# Patient Record
Sex: FEMALE | Race: WHITE | Employment: OTHER | URBAN - METROPOLITAN AREA
[De-identification: names, ages, dates, MRNs, and addresses within clinical notes are randomized per-mention and may not be internally consistent; named-entity substitution may affect disease eponyms.]

---

## 2018-06-06 ENCOUNTER — NURSE TRIAGE (OUTPATIENT)
Dept: NURSING | Facility: CLINIC | Age: 68
End: 2018-06-06

## 2018-06-06 NOTE — TELEPHONE ENCOUNTER
"Patient's  called with patient present reporting that patient received a cortisone shot in left shoulder earlier today.  Patient is currently having jaw pain on both sides and headache. Patient rates her pain at a \"4-5\" on a 1-10 pain scale. Denies fever, swelling, redness, difficulty breathing/swallowing, chest pain, weakness/numbness on face/arm/leg on one side of the body, vision issues, severe pain, and vomiting. Reviewed guidelines and advised for patient to be seen within 24 hours by PCP. Patient and  agreed with plan. RN advised to call back with any changes, worsening of symptoms, and questions or concerns.       Reason for Disposition    [1] MODERATE headache (e.g., interferes with normal activities) AND [2] present > 24 hours AND [3] unexplained  (Exceptions: analgesics not tried, typical migraine, or headache part of viral illness)    Additional Information    Negative: Difficult to awaken or acting confused  (e.g., disoriented, slurred speech)    Negative: [1] Weakness of the face, arm or leg on one side of the body AND [2] new onset    Negative: [1] Numbness of the face, arm or leg on one side of the body AND [2] new onset    Negative: [1] Loss of speech or garbled speech AND [2] new onset    Negative: Passed out (i.e., lost consciousness, collapsed and was not responding)    Negative: Sounds like a life-threatening emergency to the triager    Negative: Followed a head injury within last 3 days    Negative: Pregnant    Negative: Traumatic Brain Injury (TBI) is suspected    Negative: Unable to walk, or can only walk with assistance (e.g., requires support)    Negative: Stiff neck (can't touch chin to chest)    Negative: Severe pain in one eye    Negative: [1] Other family members (or roommates) with headaches AND [2] possibility of carbon monoxide exposure    Negative: [1] SEVERE headache (e.g., excruciating) AND [2] \"worst headache\" of life    Negative: [1] SEVERE headache AND [2] " sudden-onset (i.e., reaching maximum intensity within seconds)    Negative: [1] SEVERE headache AND [2] fever    Negative: Loss of vision or double vision (Exception: same as prior migraines)    Negative: [1] Fever > 100.5 F (38.1 C) AND [2] diabetes mellitus or weak immune system (e.g., HIV positive, cancer chemo, splenectomy, organ transplant, chronic steroids)    Negative: Patient sounds very sick or weak to the triager    Negative: [1] SEVERE headache (e.g., excruciating) AND [2] not improved after 2 hours of pain medicine    Negative: [1] Vomiting AND [2] 2 or more times (Exception: similar to previous migraines)    Negative: Fever > 104 F (40 C)    Protocols used: HEADACHE-ADULT-AH    Francisca Parham RN/[facility]

## 2019-07-24 RX ORDER — CALCIUM/MAGNESIUM/ZINC 333-133 MG
175 TABLET ORAL DAILY
COMMUNITY
End: 2019-10-21

## 2019-07-24 RX ORDER — LANOLIN ALCOHOL/MO/W.PET/CERES
1000 CREAM (GRAM) TOPICAL DAILY
COMMUNITY
End: 2019-10-21

## 2019-07-24 RX ORDER — MULTIVIT-MIN/IRON/FOLIC ACID/K 18-600-40
CAPSULE ORAL DAILY
COMMUNITY
End: 2019-10-21

## 2019-07-24 RX ORDER — TRAVOPROST OPHTHALMIC SOLUTION 0.04 MG/ML
1 SOLUTION OPHTHALMIC AT BEDTIME
COMMUNITY

## 2019-07-24 RX ORDER — TRIAMTERENE/HYDROCHLOROTHIAZID 37.5-25 MG
1 TABLET ORAL DAILY
COMMUNITY

## 2019-07-24 ASSESSMENT — MIFFLIN-ST. JEOR: SCORE: 1812.29

## 2019-07-25 ENCOUNTER — HOSPITAL ENCOUNTER (OUTPATIENT)
Dept: LAB | Facility: CLINIC | Age: 69
Discharge: HOME | End: 2019-07-25
Attending: ORTHOPAEDIC SURGERY | Admitting: ORTHOPAEDIC SURGERY
Payer: MEDICARE

## 2019-07-25 DIAGNOSIS — Z01.812 PRE-OPERATIVE LABORATORY EXAMINATION: ICD-10-CM

## 2019-07-25 PROCEDURE — 40000829 ZZHCL STATISTIC STAPH AUREUS SUSCEPT SCREEN PCR: Performed by: ORTHOPAEDIC SURGERY

## 2019-07-25 PROCEDURE — 87641 MR-STAPH DNA AMP PROBE: CPT | Performed by: ORTHOPAEDIC SURGERY

## 2019-07-25 PROCEDURE — 87640 STAPH A DNA AMP PROBE: CPT | Performed by: ORTHOPAEDIC SURGERY

## 2019-07-25 PROCEDURE — 40000830 ZZHCL STATISTIC STAPH AUREUS METH RESIST SCREEN PCR: Performed by: ORTHOPAEDIC SURGERY

## 2019-07-26 LAB
MRSA DNA SPEC QL NAA+PROBE: NEGATIVE
SPECIMEN SOURCE: NORMAL

## 2019-08-04 NOTE — PHARMACY-ADMISSION MEDICATION HISTORY
Admission medication history interview status for this patient is complete. See [facility] admission navigator for allergy information, prior to admission medications and immunization status.     Med rec completed by pre admitting RN and reviewed by Pharmacy   Anna Yun RN Wed Jul 24, 2019  1:16 PM         Prior to Admission medications    Medication Sig Last Dose Taking? Auth Provider   Ascorbic Acid (VITAMIN C) 500 MG CAPS Take by mouth daily  Yes Reported, Patient   Calcium Carbonate (CALCIUM 600 PO) Take 600 mg by mouth daily  Yes Reported, Patient   cholecalciferol (VITAMIN D3) 5000 units (125 mcg) capsule Take by mouth daily  Yes Reported, Patient   Coenzyme Q10 (COQ10 PO) Take by mouth daily  Yes Reported, Patient   cyanocobalamin (VITAMIN B-12) 1000 MCG tablet Take 1,000 mcg by mouth daily  Yes Reported, Patient   Garlic 1000 MG CAPS Take 1,000 mg by mouth daily  Yes Reported, Patient   Milk Thistle-Dand-Fennel-Licor (MILK THISTLE XTRA) CAPS capsule Take 175 mg by mouth daily  Yes Reported, Patient   Omega-3 Fatty Acids (FISH OIL PO) Take by mouth daily  Yes Reported, Patient   pyridOXINE (VITAMIN B6) 100 MG TABS Take 100 mg by mouth daily  Yes Reported, Patient   travoprost LUANA FREE (TRAVATAN Z) 0.004 % ophthalmic solution Place 1 drop into both eyes At Bedtime  Yes Reported, Patient   triamterene-HCTZ (MAXZIDE-25) 37.5-25 MG tablet Take 1 tablet by mouth daily  Yes Reported, Patient

## 2019-08-12 ENCOUNTER — HOSPITAL ENCOUNTER (INPATIENT)
Facility: CLINIC | Age: 69
LOS: 1 days | Discharge: HOME | DRG: 470 | End: 2019-08-13
Attending: ORTHOPAEDIC SURGERY | Admitting: ORTHOPAEDIC SURGERY
Payer: MEDICARE

## 2019-08-12 ENCOUNTER — APPOINTMENT (OUTPATIENT)
Dept: GENERAL RADIOLOGY | Facility: CLINIC | Age: 69
DRG: 470 | End: 2019-08-12
Attending: ORTHOPAEDIC SURGERY
Payer: MEDICARE

## 2019-08-12 ENCOUNTER — ANESTHESIA (OUTPATIENT)
Dept: SURGERY | Facility: CLINIC | Age: 69
DRG: 470 | End: 2019-08-12
Payer: MEDICARE

## 2019-08-12 ENCOUNTER — ANESTHESIA EVENT (OUTPATIENT)
Dept: SURGERY | Facility: CLINIC | Age: 69
DRG: 470 | End: 2019-08-12
Payer: MEDICARE

## 2019-08-12 DIAGNOSIS — Z96.652 STATUS POST TOTAL LEFT KNEE REPLACEMENT: Primary | ICD-10-CM

## 2019-08-12 PROBLEM — Z96.659 S/P TOTAL KNEE ARTHROPLASTY: Status: ACTIVE | Noted: 2019-08-12

## 2019-08-12 LAB
CREAT SERPL-MCNC: 0.95 MG/DL (ref 0.52–1.04)
GFR SERPL CREATININE-BSD FRML MDRD: 61 ML/MIN/{1.73_M2}
PLATELET # BLD AUTO: 351 10E9/L (ref 150–450)

## 2019-08-12 PROCEDURE — 40000171 ZZH STATISTIC PRE-PROCEDURE ASSESSMENT III: Performed by: ORTHOPAEDIC SURGERY

## 2019-08-12 PROCEDURE — 25000132 ZZH RX MED GY IP 250 OP 250 PS 637: Mod: GY | Performed by: ORTHOPAEDIC SURGERY

## 2019-08-12 PROCEDURE — 85049 AUTOMATED PLATELET COUNT: CPT | Performed by: ORTHOPAEDIC SURGERY

## 2019-08-12 PROCEDURE — C1776 JOINT DEVICE (IMPLANTABLE): HCPCS | Performed by: ORTHOPAEDIC SURGERY

## 2019-08-12 PROCEDURE — 71000013 ZZH RECOVERY PHASE 1 LEVEL 1 EA ADDTL HR: Performed by: ORTHOPAEDIC SURGERY

## 2019-08-12 PROCEDURE — 25000125 ZZHC RX 250: Performed by: PHYSICIAN ASSISTANT

## 2019-08-12 PROCEDURE — 36000063 ZZH SURGERY LEVEL 4 EA 15 ADDTL MIN: Performed by: ORTHOPAEDIC SURGERY

## 2019-08-12 PROCEDURE — 0SRD0J9 REPLACEMENT OF LEFT KNEE JOINT WITH SYNTHETIC SUBSTITUTE, CEMENTED, OPEN APPROACH: ICD-10-PCS | Performed by: ORTHOPAEDIC SURGERY

## 2019-08-12 PROCEDURE — 25000132 ZZH RX MED GY IP 250 OP 250 PS 637: Mod: GY | Performed by: ANESTHESIOLOGY

## 2019-08-12 PROCEDURE — 25000128 H RX IP 250 OP 636: Performed by: NURSE ANESTHETIST, CERTIFIED REGISTERED

## 2019-08-12 PROCEDURE — 40000986 XR KNEE PORT LT 1/2 VW

## 2019-08-12 PROCEDURE — 12000000 ZZH R&B MED SURG/OB

## 2019-08-12 PROCEDURE — 71000012 ZZH RECOVERY PHASE 1 LEVEL 1 FIRST HR: Performed by: ORTHOPAEDIC SURGERY

## 2019-08-12 PROCEDURE — 82565 ASSAY OF CREATININE: CPT | Performed by: ORTHOPAEDIC SURGERY

## 2019-08-12 PROCEDURE — 25800030 ZZH RX IP 258 OP 636: Performed by: ANESTHESIOLOGY

## 2019-08-12 PROCEDURE — 25000128 H RX IP 250 OP 636: Performed by: ORTHOPAEDIC SURGERY

## 2019-08-12 PROCEDURE — 25000128 H RX IP 250 OP 636: Performed by: ANESTHESIOLOGY

## 2019-08-12 PROCEDURE — 25000125 ZZHC RX 250: Performed by: ORTHOPAEDIC SURGERY

## 2019-08-12 PROCEDURE — 36415 COLL VENOUS BLD VENIPUNCTURE: CPT | Performed by: ORTHOPAEDIC SURGERY

## 2019-08-12 PROCEDURE — 36000093 ZZH SURGERY LEVEL 4 1ST 30 MIN: Performed by: ORTHOPAEDIC SURGERY

## 2019-08-12 PROCEDURE — 99221 1ST HOSP IP/OBS SF/LOW 40: CPT | Performed by: INTERNAL MEDICINE

## 2019-08-12 PROCEDURE — 25000125 ZZHC RX 250: Performed by: NURSE ANESTHETIST, CERTIFIED REGISTERED

## 2019-08-12 PROCEDURE — 37000009 ZZH ANESTHESIA TECHNICAL FEE, EACH ADDTL 15 MIN: Performed by: ORTHOPAEDIC SURGERY

## 2019-08-12 PROCEDURE — 27810169 ZZH OR IMPLANT GENERAL: Performed by: ORTHOPAEDIC SURGERY

## 2019-08-12 PROCEDURE — 25000128 H RX IP 250 OP 636: Performed by: INTERNAL MEDICINE

## 2019-08-12 PROCEDURE — 25800025 ZZH RX 258: Performed by: ORTHOPAEDIC SURGERY

## 2019-08-12 PROCEDURE — 25000128 H RX IP 250 OP 636: Performed by: PHYSICIAN ASSISTANT

## 2019-08-12 PROCEDURE — 25000132 ZZH RX MED GY IP 250 OP 250 PS 637: Mod: GY | Performed by: INTERNAL MEDICINE

## 2019-08-12 PROCEDURE — 25000132 ZZH RX MED GY IP 250 OP 250 PS 637: Mod: GY | Performed by: PHYSICIAN ASSISTANT

## 2019-08-12 PROCEDURE — 25000125 ZZHC RX 250: Performed by: INTERNAL MEDICINE

## 2019-08-12 PROCEDURE — 27210794 ZZH OR GENERAL SUPPLY STERILE: Performed by: ORTHOPAEDIC SURGERY

## 2019-08-12 PROCEDURE — 25800030 ZZH RX IP 258 OP 636: Performed by: ORTHOPAEDIC SURGERY

## 2019-08-12 PROCEDURE — 37000008 ZZH ANESTHESIA TECHNICAL FEE, 1ST 30 MIN: Performed by: ORTHOPAEDIC SURGERY

## 2019-08-12 PROCEDURE — 99207 ZZC CONSULT E&M CHANGED TO INITIAL LEVEL: CPT | Performed by: INTERNAL MEDICINE

## 2019-08-12 DEVICE — IMP COMP FEMORAL F LT 00-5764-016-51: Type: IMPLANTABLE DEVICE | Site: KNEE | Status: FUNCTIONAL

## 2019-08-12 DEVICE — IMP ART SURFACE ZIM NEXGEN LPS EF 3-4 10MM 00-5964-032-10: Type: IMPLANTABLE DEVICE | Site: KNEE | Status: FUNCTIONAL

## 2019-08-12 DEVICE — IMP COMP PATELLA ZIM NEXGEN 8.5X32MM 00-5972-065-32: Type: IMPLANTABLE DEVICE | Site: KNEE | Status: FUNCTIONAL

## 2019-08-12 DEVICE — IMP COMP TIBIAL STEMMED ZIM NEXGEN SIZE 3 5980-37-01: Type: IMPLANTABLE DEVICE | Site: KNEE | Status: FUNCTIONAL

## 2019-08-12 DEVICE — BONE CEMENT SIMPLEX W/TOBRAMYCIN 6197-9-001: Type: IMPLANTABLE DEVICE | Site: KNEE | Status: FUNCTIONAL

## 2019-08-12 RX ORDER — CARBOXYMETHYLCELLULOSE SODIUM 5 MG/ML
1 SOLUTION/ DROPS OPHTHALMIC 4 TIMES DAILY PRN
COMMUNITY

## 2019-08-12 RX ORDER — CEFAZOLIN SODIUM 2 G/100ML
2 INJECTION, SOLUTION INTRAVENOUS EVERY 8 HOURS
Status: COMPLETED | OUTPATIENT
Start: 2019-08-12 | End: 2019-08-12

## 2019-08-12 RX ORDER — FERROUS GLUCONATE 324(38)MG
324 TABLET ORAL DAILY
Status: DISCONTINUED | OUTPATIENT
Start: 2019-08-12 | End: 2019-08-13 | Stop reason: HOSPADM

## 2019-08-12 RX ORDER — LIDOCAINE 40 MG/G
CREAM TOPICAL
Status: DISCONTINUED | OUTPATIENT
Start: 2019-08-12 | End: 2019-08-13 | Stop reason: HOSPADM

## 2019-08-12 RX ORDER — ONDANSETRON 2 MG/ML
INJECTION INTRAMUSCULAR; INTRAVENOUS PRN
Status: DISCONTINUED | OUTPATIENT
Start: 2019-08-12 | End: 2019-08-12

## 2019-08-12 RX ORDER — MORPHINE SULFATE 4 MG/ML
2-4 INJECTION, SOLUTION INTRAMUSCULAR; INTRAVENOUS EVERY 5 MIN PRN
Status: DISCONTINUED | OUTPATIENT
Start: 2019-08-12 | End: 2019-08-12 | Stop reason: HOSPADM

## 2019-08-12 RX ORDER — FENTANYL CITRATE 50 UG/ML
INJECTION, SOLUTION INTRAMUSCULAR; INTRAVENOUS PRN
Status: DISCONTINUED | OUTPATIENT
Start: 2019-08-12 | End: 2019-08-12

## 2019-08-12 RX ORDER — TRIAMTERENE/HYDROCHLOROTHIAZID 37.5-25 MG
1 TABLET ORAL DAILY
Status: DISCONTINUED | OUTPATIENT
Start: 2019-08-12 | End: 2019-08-13 | Stop reason: HOSPADM

## 2019-08-12 RX ORDER — ONDANSETRON 2 MG/ML
4 INJECTION INTRAMUSCULAR; INTRAVENOUS EVERY 30 MIN PRN
Status: DISCONTINUED | OUTPATIENT
Start: 2019-08-12 | End: 2019-08-12 | Stop reason: HOSPADM

## 2019-08-12 RX ORDER — TRAMADOL HYDROCHLORIDE 50 MG/1
50 TABLET ORAL EVERY 6 HOURS PRN
Status: DISCONTINUED | OUTPATIENT
Start: 2019-08-12 | End: 2019-08-13 | Stop reason: HOSPADM

## 2019-08-12 RX ORDER — CHLORAL HYDRATE 500 MG
1 CAPSULE ORAL DAILY
Status: DISCONTINUED | OUTPATIENT
Start: 2019-08-13 | End: 2019-08-13 | Stop reason: HOSPADM

## 2019-08-12 RX ORDER — MORPHINE SULFATE 2 MG/ML
2-4 INJECTION, SOLUTION INTRAMUSCULAR; INTRAVENOUS
Status: DISCONTINUED | OUTPATIENT
Start: 2019-08-12 | End: 2019-08-13 | Stop reason: HOSPADM

## 2019-08-12 RX ORDER — TRAVOPROST OPHTHALMIC SOLUTION 0.04 MG/ML
1 SOLUTION OPHTHALMIC AT BEDTIME
Status: DISCONTINUED | OUTPATIENT
Start: 2019-08-12 | End: 2019-08-13 | Stop reason: HOSPADM

## 2019-08-12 RX ORDER — PROCHLORPERAZINE MALEATE 5 MG
5 TABLET ORAL EVERY 6 HOURS PRN
Status: DISCONTINUED | OUTPATIENT
Start: 2019-08-12 | End: 2019-08-13 | Stop reason: HOSPADM

## 2019-08-12 RX ORDER — NALOXONE HYDROCHLORIDE 0.4 MG/ML
.1-.4 INJECTION, SOLUTION INTRAMUSCULAR; INTRAVENOUS; SUBCUTANEOUS
Status: DISCONTINUED | OUTPATIENT
Start: 2019-08-12 | End: 2019-08-13 | Stop reason: HOSPADM

## 2019-08-12 RX ORDER — CARBOXYMETHYLCELLULOSE SODIUM 10 MG/ML
1 GEL OPHTHALMIC 4 TIMES DAILY PRN
Status: DISCONTINUED | OUTPATIENT
Start: 2019-08-12 | End: 2019-08-12

## 2019-08-12 RX ORDER — SODIUM CHLORIDE, SODIUM LACTATE, POTASSIUM CHLORIDE, CALCIUM CHLORIDE 600; 310; 30; 20 MG/100ML; MG/100ML; MG/100ML; MG/100ML
INJECTION, SOLUTION INTRAVENOUS CONTINUOUS
Status: DISCONTINUED | OUTPATIENT
Start: 2019-08-12 | End: 2019-08-12 | Stop reason: HOSPADM

## 2019-08-12 RX ORDER — ACETAMINOPHEN 325 MG/1
650 TABLET ORAL EVERY 4 HOURS PRN
Status: DISCONTINUED | OUTPATIENT
Start: 2019-08-15 | End: 2019-08-13 | Stop reason: HOSPADM

## 2019-08-12 RX ORDER — LABETALOL 20 MG/4 ML (5 MG/ML) INTRAVENOUS SYRINGE
PRN
Status: DISCONTINUED | OUTPATIENT
Start: 2019-08-12 | End: 2019-08-12

## 2019-08-12 RX ORDER — SODIUM CHLORIDE, SODIUM LACTATE, POTASSIUM CHLORIDE, CALCIUM CHLORIDE 600; 310; 30; 20 MG/100ML; MG/100ML; MG/100ML; MG/100ML
INJECTION, SOLUTION INTRAVENOUS CONTINUOUS
Status: DISCONTINUED | OUTPATIENT
Start: 2019-08-12 | End: 2019-08-13 | Stop reason: HOSPADM

## 2019-08-12 RX ORDER — HYDRALAZINE HYDROCHLORIDE 20 MG/ML
2.5-5 INJECTION INTRAMUSCULAR; INTRAVENOUS EVERY 10 MIN PRN
Status: DISCONTINUED | OUTPATIENT
Start: 2019-08-12 | End: 2019-08-12 | Stop reason: HOSPADM

## 2019-08-12 RX ORDER — ONDANSETRON 4 MG/1
4 TABLET, ORALLY DISINTEGRATING ORAL EVERY 30 MIN PRN
Status: DISCONTINUED | OUTPATIENT
Start: 2019-08-12 | End: 2019-08-12 | Stop reason: HOSPADM

## 2019-08-12 RX ORDER — DIFLUPREDNATE OPHTHALMIC 0.5 MG/ML
1 EMULSION OPHTHALMIC AT BEDTIME
Status: DISCONTINUED | OUTPATIENT
Start: 2019-08-12 | End: 2019-08-13 | Stop reason: HOSPADM

## 2019-08-12 RX ORDER — ONDANSETRON 4 MG/1
4 TABLET, ORALLY DISINTEGRATING ORAL EVERY 6 HOURS PRN
Status: DISCONTINUED | OUTPATIENT
Start: 2019-08-12 | End: 2019-08-13 | Stop reason: HOSPADM

## 2019-08-12 RX ORDER — DIFLUPREDNATE OPHTHALMIC 0.5 MG/ML
1 EMULSION OPHTHALMIC EVERY MORNING
Status: DISCONTINUED | OUTPATIENT
Start: 2019-08-13 | End: 2019-08-13 | Stop reason: HOSPADM

## 2019-08-12 RX ORDER — LABETALOL 20 MG/4 ML (5 MG/ML) INTRAVENOUS SYRINGE
10
Status: DISCONTINUED | OUTPATIENT
Start: 2019-08-12 | End: 2019-08-12 | Stop reason: HOSPADM

## 2019-08-12 RX ORDER — DIPHENHYDRAMINE HYDROCHLORIDE 50 MG/ML
12.5 INJECTION INTRAMUSCULAR; INTRAVENOUS EVERY 6 HOURS PRN
Status: DISCONTINUED | OUTPATIENT
Start: 2019-08-12 | End: 2019-08-13 | Stop reason: HOSPADM

## 2019-08-12 RX ORDER — AMOXICILLIN 250 MG
2 CAPSULE ORAL 2 TIMES DAILY
Status: DISCONTINUED | OUTPATIENT
Start: 2019-08-12 | End: 2019-08-13 | Stop reason: HOSPADM

## 2019-08-12 RX ORDER — CEFAZOLIN SODIUM 1 G/3ML
1 INJECTION, POWDER, FOR SOLUTION INTRAMUSCULAR; INTRAVENOUS SEE ADMIN INSTRUCTIONS
Status: DISCONTINUED | OUTPATIENT
Start: 2019-08-12 | End: 2019-08-12 | Stop reason: HOSPADM

## 2019-08-12 RX ORDER — CELECOXIB 200 MG/1
400 CAPSULE ORAL ONCE
Status: COMPLETED | OUTPATIENT
Start: 2019-08-12 | End: 2019-08-12

## 2019-08-12 RX ORDER — DIPHENHYDRAMINE HCL 12.5MG/5ML
12.5 LIQUID (ML) ORAL EVERY 6 HOURS PRN
Status: DISCONTINUED | OUTPATIENT
Start: 2019-08-12 | End: 2019-08-13 | Stop reason: HOSPADM

## 2019-08-12 RX ORDER — ACETAMINOPHEN 325 MG/1
975 TABLET ORAL EVERY 8 HOURS
Status: DISCONTINUED | OUTPATIENT
Start: 2019-08-12 | End: 2019-08-13 | Stop reason: HOSPADM

## 2019-08-12 RX ORDER — PROPOFOL 10 MG/ML
INJECTION, EMULSION INTRAVENOUS CONTINUOUS PRN
Status: DISCONTINUED | OUTPATIENT
Start: 2019-08-12 | End: 2019-08-12

## 2019-08-12 RX ORDER — SCOLOPAMINE TRANSDERMAL SYSTEM 1 MG/1
1 PATCH, EXTENDED RELEASE TRANSDERMAL
Status: DISCONTINUED | OUTPATIENT
Start: 2019-08-12 | End: 2019-08-13 | Stop reason: HOSPADM

## 2019-08-12 RX ORDER — NEOSTIGMINE METHYLSULFATE 1 MG/ML
VIAL (ML) INJECTION PRN
Status: DISCONTINUED | OUTPATIENT
Start: 2019-08-12 | End: 2019-08-12

## 2019-08-12 RX ORDER — AMOXICILLIN 250 MG
1 CAPSULE ORAL 2 TIMES DAILY
Status: DISCONTINUED | OUTPATIENT
Start: 2019-08-12 | End: 2019-08-13 | Stop reason: HOSPADM

## 2019-08-12 RX ORDER — HYDROXYZINE HYDROCHLORIDE 10 MG/1
10 TABLET, FILM COATED ORAL EVERY 6 HOURS PRN
Status: DISCONTINUED | OUTPATIENT
Start: 2019-08-12 | End: 2019-08-13 | Stop reason: HOSPADM

## 2019-08-12 RX ORDER — LIDOCAINE HYDROCHLORIDE 10 MG/ML
INJECTION, SOLUTION INFILTRATION; PERINEURAL PRN
Status: DISCONTINUED | OUTPATIENT
Start: 2019-08-12 | End: 2019-08-12

## 2019-08-12 RX ORDER — CEFAZOLIN SODIUM IN 0.9 % NACL 3 G/100 ML
3 INTRAVENOUS SOLUTION, PIGGYBACK (ML) INTRAVENOUS
Status: COMPLETED | OUTPATIENT
Start: 2019-08-12 | End: 2019-08-12

## 2019-08-12 RX ORDER — ACETAMINOPHEN 500 MG
1000 TABLET ORAL ONCE
Status: COMPLETED | OUTPATIENT
Start: 2019-08-12 | End: 2019-08-12

## 2019-08-12 RX ORDER — CARBOXYMETHYLCELLULOSE SODIUM 5 MG/ML
1 SOLUTION/ DROPS OPHTHALMIC 4 TIMES DAILY PRN
Status: DISCONTINUED | OUTPATIENT
Start: 2019-08-12 | End: 2019-08-13 | Stop reason: HOSPADM

## 2019-08-12 RX ORDER — LIDOCAINE 40 MG/G
CREAM TOPICAL
Status: DISCONTINUED | OUTPATIENT
Start: 2019-08-12 | End: 2019-08-12 | Stop reason: HOSPADM

## 2019-08-12 RX ORDER — FENTANYL CITRATE 50 UG/ML
25-50 INJECTION, SOLUTION INTRAMUSCULAR; INTRAVENOUS
Status: DISCONTINUED | OUTPATIENT
Start: 2019-08-12 | End: 2019-08-12 | Stop reason: HOSPADM

## 2019-08-12 RX ORDER — METOCLOPRAMIDE HYDROCHLORIDE 5 MG/ML
5 INJECTION INTRAMUSCULAR; INTRAVENOUS ONCE
Status: COMPLETED | OUTPATIENT
Start: 2019-08-12 | End: 2019-08-12

## 2019-08-12 RX ORDER — LORAZEPAM 2 MG/ML
0.5 INJECTION INTRAMUSCULAR EVERY 6 HOURS PRN
Status: DISCONTINUED | OUTPATIENT
Start: 2019-08-12 | End: 2019-08-13 | Stop reason: HOSPADM

## 2019-08-12 RX ORDER — HYDROMORPHONE HYDROCHLORIDE 1 MG/ML
.3-.5 INJECTION, SOLUTION INTRAMUSCULAR; INTRAVENOUS; SUBCUTANEOUS
Status: DISCONTINUED | OUTPATIENT
Start: 2019-08-12 | End: 2019-08-12 | Stop reason: ALTCHOICE

## 2019-08-12 RX ORDER — CELECOXIB 200 MG/1
200 CAPSULE ORAL DAILY
Status: DISCONTINUED | OUTPATIENT
Start: 2019-08-13 | End: 2019-08-13 | Stop reason: HOSPADM

## 2019-08-12 RX ORDER — NALOXONE HYDROCHLORIDE 0.4 MG/ML
.1-.4 INJECTION, SOLUTION INTRAMUSCULAR; INTRAVENOUS; SUBCUTANEOUS
Status: DISCONTINUED | OUTPATIENT
Start: 2019-08-12 | End: 2019-08-12

## 2019-08-12 RX ORDER — METOPROLOL TARTRATE 1 MG/ML
INJECTION, SOLUTION INTRAVENOUS PRN
Status: DISCONTINUED | OUTPATIENT
Start: 2019-08-12 | End: 2019-08-12

## 2019-08-12 RX ORDER — GLYCOPYRROLATE 0.2 MG/ML
INJECTION, SOLUTION INTRAMUSCULAR; INTRAVENOUS PRN
Status: DISCONTINUED | OUTPATIENT
Start: 2019-08-12 | End: 2019-08-12

## 2019-08-12 RX ORDER — DEXAMETHASONE SODIUM PHOSPHATE 4 MG/ML
INJECTION, SOLUTION INTRA-ARTICULAR; INTRALESIONAL; INTRAMUSCULAR; INTRAVENOUS; SOFT TISSUE PRN
Status: DISCONTINUED | OUTPATIENT
Start: 2019-08-12 | End: 2019-08-12

## 2019-08-12 RX ORDER — PROPOFOL 10 MG/ML
INJECTION, EMULSION INTRAVENOUS PRN
Status: DISCONTINUED | OUTPATIENT
Start: 2019-08-12 | End: 2019-08-12

## 2019-08-12 RX ORDER — OXYCODONE HYDROCHLORIDE 5 MG/1
5-10 TABLET ORAL EVERY 4 HOURS PRN
Status: DISCONTINUED | OUTPATIENT
Start: 2019-08-12 | End: 2019-08-13 | Stop reason: HOSPADM

## 2019-08-12 RX ORDER — ONDANSETRON 2 MG/ML
4 INJECTION INTRAMUSCULAR; INTRAVENOUS EVERY 6 HOURS PRN
Status: DISCONTINUED | OUTPATIENT
Start: 2019-08-12 | End: 2019-08-13 | Stop reason: HOSPADM

## 2019-08-12 RX ADMIN — TRAVOPROST OPHTHALMIC SOLUTION 1 DROP: 0.04 SOLUTION OPHTHALMIC at 21:00

## 2019-08-12 RX ADMIN — SODIUM CHLORIDE, POTASSIUM CHLORIDE, SODIUM LACTATE AND CALCIUM CHLORIDE: 600; 310; 30; 20 INJECTION, SOLUTION INTRAVENOUS at 14:38

## 2019-08-12 RX ADMIN — DEXAMETHASONE SODIUM PHOSPHATE 4 MG: 4 INJECTION, SOLUTION INTRA-ARTICULAR; INTRALESIONAL; INTRAMUSCULAR; INTRAVENOUS; SOFT TISSUE at 07:25

## 2019-08-12 RX ADMIN — ONDANSETRON HYDROCHLORIDE 4 MG: 2 INJECTION, SOLUTION INTRAMUSCULAR; INTRAVENOUS at 22:19

## 2019-08-12 RX ADMIN — CARBOXYMETHYLCELLULOSE SODIUM 1 DROP: 10 GEL OPHTHALMIC at 17:14

## 2019-08-12 RX ADMIN — CEFAZOLIN SODIUM 2 G: 2 INJECTION, SOLUTION INTRAVENOUS at 23:19

## 2019-08-12 RX ADMIN — MORPHINE SULFATE 2 MG: 4 INJECTION INTRAVENOUS at 10:11

## 2019-08-12 RX ADMIN — FENTANYL CITRATE 150 MCG: 50 INJECTION, SOLUTION INTRAMUSCULAR; INTRAVENOUS at 07:35

## 2019-08-12 RX ADMIN — CEFAZOLIN SODIUM 2 G: 2 INJECTION, SOLUTION INTRAVENOUS at 15:38

## 2019-08-12 RX ADMIN — LIDOCAINE HYDROCHLORIDE 30 MG: 10 INJECTION, SOLUTION INFILTRATION; PERINEURAL at 07:25

## 2019-08-12 RX ADMIN — METOCLOPRAMIDE 5 MG: 5 INJECTION, SOLUTION INTRAMUSCULAR; INTRAVENOUS at 15:37

## 2019-08-12 RX ADMIN — OXYCODONE HYDROCHLORIDE 5 MG: 5 TABLET ORAL at 22:19

## 2019-08-12 RX ADMIN — FENTANYL CITRATE 50 MCG: 50 INJECTION INTRAMUSCULAR; INTRAVENOUS at 09:23

## 2019-08-12 RX ADMIN — ACETAMINOPHEN 1000 MG: 500 TABLET, FILM COATED ORAL at 10:47

## 2019-08-12 RX ADMIN — MORPHINE SULFATE 2 MG: 4 INJECTION INTRAVENOUS at 11:25

## 2019-08-12 RX ADMIN — Medication 3 G: at 07:20

## 2019-08-12 RX ADMIN — MIDAZOLAM 1 MG: 1 INJECTION INTRAMUSCULAR; INTRAVENOUS at 07:20

## 2019-08-12 RX ADMIN — Medication 1 G: at 07:34

## 2019-08-12 RX ADMIN — GLYCOPYRROLATE 0.2 MG: 0.2 INJECTION, SOLUTION INTRAMUSCULAR; INTRAVENOUS at 07:25

## 2019-08-12 RX ADMIN — MORPHINE SULFATE 2 MG: 4 INJECTION INTRAVENOUS at 11:01

## 2019-08-12 RX ADMIN — Medication 1 G: at 09:03

## 2019-08-12 RX ADMIN — CELECOXIB 400 MG: 200 CAPSULE ORAL at 06:01

## 2019-08-12 RX ADMIN — MORPHINE SULFATE 5 MG: 10 INJECTION, SOLUTION INTRAMUSCULAR; INTRAVENOUS at 07:45

## 2019-08-12 RX ADMIN — ROCURONIUM BROMIDE 40 MG: 10 INJECTION INTRAVENOUS at 07:25

## 2019-08-12 RX ADMIN — ACETAMINOPHEN 975 MG: 325 TABLET, FILM COATED ORAL at 18:29

## 2019-08-12 RX ADMIN — FENTANYL CITRATE 100 MCG: 50 INJECTION, SOLUTION INTRAMUSCULAR; INTRAVENOUS at 07:25

## 2019-08-12 RX ADMIN — FENTANYL CITRATE 50 MCG: 50 INJECTION INTRAMUSCULAR; INTRAVENOUS at 09:44

## 2019-08-12 RX ADMIN — SODIUM CHLORIDE, POTASSIUM CHLORIDE, SODIUM LACTATE AND CALCIUM CHLORIDE: 600; 310; 30; 20 INJECTION, SOLUTION INTRAVENOUS at 07:20

## 2019-08-12 RX ADMIN — ONDANSETRON HYDROCHLORIDE 4 MG: 2 INJECTION, SOLUTION INTRAMUSCULAR; INTRAVENOUS at 12:33

## 2019-08-12 RX ADMIN — PROPOFOL 170 MG: 10 INJECTION, EMULSION INTRAVENOUS at 07:25

## 2019-08-12 RX ADMIN — PROCHLORPERAZINE EDISYLATE 5 MG: 5 INJECTION INTRAMUSCULAR; INTRAVENOUS at 14:14

## 2019-08-12 RX ADMIN — GLYCOPYRROLATE 0.4 MG: 0.2 INJECTION, SOLUTION INTRAMUSCULAR; INTRAVENOUS at 08:58

## 2019-08-12 RX ADMIN — SODIUM CHLORIDE, POTASSIUM CHLORIDE, SODIUM LACTATE AND CALCIUM CHLORIDE: 600; 310; 30; 20 INJECTION, SOLUTION INTRAVENOUS at 18:27

## 2019-08-12 RX ADMIN — SCOPALAMINE 1 PATCH: 1 PATCH, EXTENDED RELEASE TRANSDERMAL at 15:46

## 2019-08-12 RX ADMIN — HYDROXYZINE HYDROCHLORIDE 10 MG: 10 TABLET ORAL at 17:22

## 2019-08-12 RX ADMIN — MIDAZOLAM 2 MG: 1 INJECTION INTRAMUSCULAR; INTRAVENOUS at 06:54

## 2019-08-12 RX ADMIN — MORPHINE SULFATE 5 MG: 10 INJECTION, SOLUTION INTRAMUSCULAR; INTRAVENOUS at 07:40

## 2019-08-12 RX ADMIN — METOPROLOL TARTRATE 3 MG: 1 INJECTION, SOLUTION INTRAVENOUS at 07:53

## 2019-08-12 RX ADMIN — Medication 3 MG: at 08:58

## 2019-08-12 RX ADMIN — LABETALOL 20 MG/4 ML (5 MG/ML) INTRAVENOUS SYRINGE 5 MG: at 09:15

## 2019-08-12 RX ADMIN — MIDAZOLAM 1 MG: 1 INJECTION INTRAMUSCULAR; INTRAVENOUS at 07:22

## 2019-08-12 RX ADMIN — ONDANSETRON 4 MG: 2 INJECTION INTRAMUSCULAR; INTRAVENOUS at 08:01

## 2019-08-12 RX ADMIN — SENNOSIDES AND DOCUSATE SODIUM 1 TABLET: 8.6; 5 TABLET ORAL at 21:01

## 2019-08-12 RX ADMIN — METOPROLOL TARTRATE 2 MG: 1 INJECTION, SOLUTION INTRAVENOUS at 08:01

## 2019-08-12 RX ADMIN — SODIUM CHLORIDE, POTASSIUM CHLORIDE, SODIUM LACTATE AND CALCIUM CHLORIDE: 600; 310; 30; 20 INJECTION, SOLUTION INTRAVENOUS at 09:06

## 2019-08-12 RX ADMIN — HYDROXYZINE HYDROCHLORIDE 10 MG: 10 TABLET ORAL at 23:21

## 2019-08-12 RX ADMIN — PROPOFOL 50 MCG/KG/MIN: 10 INJECTION, EMULSION INTRAVENOUS at 07:37

## 2019-08-12 RX ADMIN — MORPHINE SULFATE 2 MG: 4 INJECTION INTRAVENOUS at 10:38

## 2019-08-12 RX ADMIN — LORAZEPAM 0.5 MG: 2 INJECTION INTRAMUSCULAR; INTRAVENOUS at 18:27

## 2019-08-12 RX ADMIN — OXYCODONE HYDROCHLORIDE 5 MG: 5 TABLET ORAL at 23:21

## 2019-08-12 ASSESSMENT — ACTIVITIES OF DAILY LIVING (ADL)
ADLS_ACUITY_SCORE: 13
ADLS_ACUITY_SCORE: 15

## 2019-08-12 ASSESSMENT — MIFFLIN-ST. JEOR: SCORE: 1807.04

## 2019-08-12 NOTE — ANESTHESIA CARE TRANSFER NOTE
Patient: Sg Francisco    Procedure(s):  Left total knee arthroplasty    Diagnosis: djd  Diagnosis Additional Information: No value filed.    Anesthesia Type:   General, ETT, Periph. Nerve Block for postop pain     Note:  Airway :Face Mask  Patient transferred to:PACU  Handoff Report: Identifed the Patient, Identified the Reponsible Provider, Reviewed the pertinent medical history, Discussed the surgical course, Reviewed Intra-OP anesthesia mangement and issues during anesthesia, Set expectations for post-procedure period and Allowed opportunity for questions and acknowledgement of understanding      Vitals: (Last set prior to Anesthesia Care Transfer)    CRNA VITALS  8/12/2019 0839 - 8/12/2019 0918      8/12/2019             Pulse:  110    SpO2:  95 %    Resp Rate (observed):  20                Electronically Signed By: ZHAO Nino CRNA  August 12, 2019  9:18 AM

## 2019-08-12 NOTE — OP NOTE
Procedure Date: 08/12/2019      PREOPERATIVE DIAGNOSIS:     1.  Severe osteoarthritis, left knee.   2.  Morbid obesity (BMI equals 45).      POSTOPERATIVE DIAGNOSIS:     1.  Severe osteoarthritis, left knee.   2.  Morbid obesity (BMI equals 45).      PROCEDURE:  Left total knee arthroplasty.      SURGEON:  Caly Bourgeois MD      ASSISTANT:  Elodia Werner PA-C      ANESTHESIA:  General with adductor canal block.      ESTIMATED BLOOD LOSS:  25 mL.      TOURNIQUET TIME:  Approximately 80 minutes.      COMPLICATIONS:  None.      DESCRIPTION OF PROCEDURE:  The patient was taken to the operating room where after successful administration of general anesthetic, adductor canal block, antibiotic prophylaxis and sterile prep and drape, the leg was exsanguinated and an anterior incision was made followed by a medial arthrotomy and joint debridement.  Of note, she had substantial subcutaneous adipose tissue and, with her elevated BMI, all portions of the case required substantial additional time.  An intramedullary 5-degree guide was used with anterior referencing at 3 degrees of external rotation which did appear to match the epicondylar axis and a box cut was made for PCL substitution.  Retractors were carefully placed about the proximal tibia and a cut was made perpendicular to the mechanical axis of the tibia, removing 2 mm of medial bone.  Tibial rotation was matched to the femur and at the junction of the medial and middle thirds of the tubercle.  Tibial preparation was done and 7 mm was resected from the undersurface of a 21 mm patella and sized appropriately.  Posterior osteophytes were removed under direct vision with the knee in hyperflexion, carefully protecting the neurovascular structures and a capsular injection was similarly performed.  Flexion and extension gaps were rectangular and symmetric.      After copious lavage and drying, antibiotic-impregnated Simplex cement was used for a Rocio NexGen gender  specific cruciate substituting size F femur, size 3 tibia, 10-mm polyethylene insert and 32 mm patellar button.  Range of motion, balance and tracking were excellent.  There was no laxity at 0 and 30 degrees with minimal sagittal laxity at 90 degrees.  Of note, gender specific implant was needed due to the mismatch between the anterior to posterior versus the medial to lateral dimensions.  Copious antibiotic and Betadine irrigation were performed followed by layered closure over a deep drain.  There were no complications.         DAGMAR BROWNING MD             D: 2019   T: 2019   MT: ELHAM      Name:     MAIKEL ZULUAGA   MRN:      [id number]        Account:        [id number]   :      1950           Procedure Date: 2019      Document: [id number]

## 2019-08-12 NOTE — PLAN OF CARE
PT: Eval orders received, chart reviewed. RN present upon arrival. Per RN pt has been feeling nauseated, unable to participate in PT evaluation. Nursing to mobilize pt this evening. Cancel evaluation this day

## 2019-08-12 NOTE — ANESTHESIA PROCEDURE NOTES
Peripheral nerve/Neuraxial procedure note : Saphenous  Pre-Procedure  Performed by Fernando Brito MD  Referred by NALLELY  Location: pre-op    Procedure Times:8/12/2019 6:54 AM and 8/12/2019 7:04 AM  Pre-Anesthestic Checklist: patient identified, IV checked, site marked, risks and benefits discussed, informed consent, monitors and equipment checked, pre-op evaluation, at physician/surgeon's request and post-op pain management    Timeout  Correct Patient: Yes   Correct Procedure: Yes   Correct Site: Yes   Correct Laterality: Yes   Correct Position: Yes   Site Marked: Yes   .   Procedure Documentation    Diagnosis:LF TKR.    Procedure:  left  Saphenous.  Local skin infiltrated with 1 mL of 1% lidocaine.     Ultrasound used to identify targeted nerve, plexus, or vascular marker and placed a needle adjacent to it., Ultrasound was used to visualize the spread of the anesthetic in close proximity to the above stated nerve.   Patient Prep;sterile gloves, povidone-iodine 7.5% surgical scrub.  .  Needle: insulated Needle Gauge: 22.    Needle Length (Inches) 3.13  Insertion Method: Single Shot.       Assessment/Narrative  Paresthesias: No.  .  The placement was negative for: blood aspirated, painful injection and site bleeding.  Bolus given via needle..   Secured via.   Complications: none. Comments:  10cc each, 2lido w/ epi and 0.75marc. The surgeon has given a verbal order transferring care of this patient to me for the performance of a regional analgesia block for post-op pain control. It is requested of me because I am uniquely trained and qualified to perform this block and the surgeon is neither trained nor qualified to perform this procedure.

## 2019-08-12 NOTE — ANESTHESIA PREPROCEDURE EVALUATION
Anesthesia Pre-Procedure Evaluation    Patient: Sg Francisco   MRN: [id number] : 1950          Preoperative Diagnosis: djd    Procedure(s):  Left total knee arthroplasty (spinal with adductor)    Past Medical History:   Diagnosis Date     Anemia     boarderline anemia     Arthritis      Gastroesophageal reflux disease      Hypertension      Motion sickness      Past Surgical History:   Procedure Laterality Date     BREAST SURGERY      lumpectomy     EYE SURGERY      cataract     GYN SURGERY      hysterectomy, BSO     ORTHOPEDIC SURGERY Left     hand     ORTHOPEDIC SURGERY Left     knee scope     SOFT TISSUE SURGERY      lipoma     Anesthesia Evaluation     . Pt has had prior anesthetic.     History of anesthetic complications   - PONV and motion sickness        ROS/MED HX    ENT/Pulmonary:       Neurologic:  - neg neurologic ROS     Cardiovascular:     (+) hypertension----. : . . . :. .       METS/Exercise Tolerance:     Hematologic:  - neg hematologic  ROS       Musculoskeletal:   (+) arthritis,  -       GI/Hepatic:     (+) GERD       Renal/Genitourinary:  - ROS Renal section negative       Endo:     (+) Obesity, .      Psychiatric:  - neg psychiatric ROS       Infectious Disease:  - neg infectious disease ROS       Malignancy:         Other:                          Physical Exam  Normal systems: cardiovascular and pulmonary    Airway   Mallampati: II  TM distance: >3 FB  Neck ROM: full    Dental     Cardiovascular       Pulmonary             No results found for: WBC, HGB, HCT, PLT, CRP, SED, NA, POTASSIUM, CHLORIDE, CO2, BUN, CR, GLC, CUONG, PHOS, MAG, ALBUMIN, PROTTOTAL, ALT, AST, GGT, ALKPHOS, BILITOTAL, BILIDIRECT, LIPASE, AMYLASE, MICHELLE, PTT, INR, FIBR, TSH, T4, T3, HCG, HCGS, CKTOTAL, CKMB, TROPN    Preop Vitals  BP Readings from Last 3 Encounters:   19 (!) 147/72    Pulse Readings from Last 3 Encounters:   No data found for Pulse      Resp Readings from Last 3 Encounters:   19 16    SpO2  "Readings from Last 3 Encounters:   08/12/19 98%      Temp Readings from Last 1 Encounters:   08/12/19 98.1  F (36.7  C) (Temporal)    Ht Readings from Last 1 Encounters:   08/12/19 1.676 m (5' 5.98\")      Wt Readings from Last 1 Encounters:   08/12/19 126.6 kg (279 lb)    Estimated body mass index is 45.05 kg/m  as calculated from the following:    Height as of this encounter: 1.676 m (5' 5.98\").    Weight as of this encounter: 126.6 kg (279 lb).       Anesthesia Plan      History & Physical Review  History and physical reviewed and following examination; no interval change.    ASA Status:  3 .    NPO Status:  > 8 hours    Plan for General, ETT and Periph. Nerve Block for postop pain with Intravenous and Propofol induction. Maintenance will be Balanced.    PONV prophylaxis:  Ondansetron (or other 5HT-3) and Dexamethasone or Solumedrol       Postoperative Care  Postoperative pain management:  IV analgesics.      Consents  Anesthetic plan, risks, benefits and alternatives discussed with:  Patient.  Use of blood products discussed: Yes.   Use of blood products discussed with Patient.  Consented to blood products.  .                 Fernando Brito MD                    .  "

## 2019-08-12 NOTE — CONSULTS
[facility]  Hospitalist Consult Note  Name: Sg Francisco    MRN: [id number]  YOB: 1950    Age: 69 year old  Date of admission: 8/12/2019  Primary care provider: Lindsey Pichardo     Requesting Physician: Dr. Bourgeois  Reason for consult:  Post-operative medical management          Assessment and Plan:   Sg Francisco is a 69 year old female with a history of morbid obesity, HTN, breast cancer, endometriosis, and osteoarthritis of the knees who was admitted on 8/12/2019 following elective left TKA by Dr. Bourgeois.  Experiencing postop nausea otherwise pain is controlled.    Assessment and plan by problem:  Osteoarthritis bilateral knees:  s/p elective left TKA on 8/12/2019 by Dr. Bourgeois.  The patient is doing well.  Currently has well controlled pain and is hemodynamically stable. Will defer diet, activity, DVT prophylaxis, and pain control to the primary team. Currently the patient is on Lovenox.  Capnography monitoring protocol.  Continue physical and occupational therapy.  Continue incentive spirometry and follow hemoglobin to evaluate for surgical blood loss and potential need for transfusion.  Previous nausea with IV Dilaudid.  Could try IV morphine or IV fentanyl as needed.    Postop nausea: Significantly nauseous on arrival in the room.  May be secondary to anesthesia, although has not had this issue except for when she received IV Dilaudid in the past.  Did receive some IV morphine downstairs.  -Zofran and Compazine as needed, if ineffective could add scopolamine patch or lorazepam    HTN: PTA on Maxzide 37.5-25 mg daily.  Blood pressure 108/76 in clinic for preop.  Immediate postop blood pressure 120-130 systolic.  -Resume Maxzide with hold parameters for low blood pressure    Morbid obesity: BMI 45.    History of breast cancer: Previous left-sided breast cancer with duct removal surgery.    History of endometriosis: Previous hysterectomy.    Cataracts: Resumed home eyedrops.  She can use  her own supply.    Code status: Full code  Prophylaxis: per primary team  Disposition: per primary team. PT/OT    Thank you for the consultation, we will continue to follow along during the hospitalization. Please page with any questions or concerns.         History of Present Illness:   Sg Francisco is a 69 year old female with a history of morbid obesity, HTN, breast cancer, endometriosis, and osteoarthritis of the knees who was admitted on 8/12/2019 following elective left TKA by Dr. Bourgeois.  Failed medical management in outpatient setting with persistent pain.  Utilizing Tylenol in outpatient setting primarily.  Prior to surgery she was in normal state health.  No recent fevers or chills.  Pre-operative note was fully reviewed and recommendations acknowledged. Op note and anesthesia notes and flow sheets reviewed.     Currently patient experiencing significant postoperative nausea.  Pain is tolerable and received some IV morphine in the PACU.  Just given Compazine for nausea.  Denies shortness of breath.              Past Medical History reviewed:   HTN  Morbid obesity  Osteoarthritis  Endometriosis  Breast cancer          Past Surgical History reviewed:     Past Surgical History:   Procedure Laterality Date     BREAST SURGERY      lumpectomy     EYE SURGERY      cataract     GYN SURGERY      hysterectomy, BSO     ORTHOPEDIC SURGERY Left     hand     ORTHOPEDIC SURGERY Left     knee scope     SOFT TISSUE SURGERY      lipoma               Social History reviewed:     Social History     Tobacco Use     Smoking status: Never Smoker     Smokeless tobacco: Never Used   Substance Use Topics     Alcohol use: Yes     Comment: once a year             Family History reviewed:     Family History   Problem Relation Age of Onset     Hypertension Mother      Cancer Father      von-Hippel-Lindau Syndrome Father      Polycythemia Maternal Grandmother              Allergies:     Allergies   Allergen Reactions     Hydromorphone  Nausea and Vomiting     dizziness     Adhesive Tape Rash     Cortisone Itching and Rash             Medications:     Prior to Admission medications    Medication Sig Last Dose Taking? Auth Provider   Ascorbic Acid (VITAMIN C) 500 MG CAPS Take by mouth daily Past Week at Unknown time Yes Reported, Patient   Calcium Carbonate (CALCIUM 600 PO) Take 600 mg by mouth daily Past Week at Unknown time Yes Reported, Patient   carboxymethylcellulose PF (REFRESH PLUS) 0.5 % ophthalmic solution Place 1 drop into both eyes 4 times daily as needed for dry eyes 8/11/2019 at 0800 Yes Reported, Patient   cholecalciferol (VITAMIN D3) 5000 units (125 mcg) capsule Take by mouth daily Past Week at Unknown time Yes Reported, Patient   Coenzyme Q10 (COQ10 PO) Take by mouth daily Past Week at Unknown time Yes Reported, Patient   cyanocobalamin (VITAMIN B-12) 1000 MCG tablet Take 1,000 mcg by mouth daily Past Week at Unknown time Yes Reported, Patient   Garlic 1000 MG CAPS Take 1,000 mg by mouth daily 8/11/2019 at 0800 Yes Reported, Patient   Milk Thistle-Dand-Fennel-Licor (MILK THISTLE XTRA) CAPS capsule Take 175 mg by mouth daily Past Week at Unknown time Yes Reported, Patient   Omega-3 Fatty Acids (FISH OIL PO) Take by mouth daily Past Week at Unknown time Yes Reported, Patient   Prenatal Vit-Fe Sulfate-FA (PRENATAL MULTIVIT-IRON PO)  8/11/2019 at 0800 Yes Reported, Patient   pyridOXINE (VITAMIN B6) 100 MG TABS Take 100 mg by mouth daily Past Week at Unknown time Yes Reported, Patient   travoprost BAK FREE (TRAVATAN Z) 0.004 % ophthalmic solution Place 1 drop into both eyes At Bedtime 8/11/2019 at 2000 Yes Reported, Patient   triamterene-HCTZ (MAXZIDE-25) 37.5-25 MG tablet Take 1 tablet by mouth daily 8/11/2019 at 0800 Yes Reported, Patient       Current hospital administered medication list (MAR) also reviewed.          Review of Systems:     A comprehensive greater than 10 system review of systems was carried out.  Pertinent positives  "and negatives are noted above.  Otherwise negative for contributory info.            Physical Exam:   Blood pressure 137/72, pulse 81, temperature 97  F (36.1  C), temperature source Axillary, resp. rate 11, height 1.676 m (5' 5.98\"), weight 126.6 kg (279 lb), SpO2 94 %.    Exam:  Constitutional: Awake, NAD   HEENT: MMM  Respiratory: Anterior lungs cta bilaterally, no crackles or wheeze  Cardiovascular: RRR.  No murmur   GI: Obese, non-tender, not distended, bowel sounds present  Skin: no rash    Musculoskeletal/extremities: Left leg wrapped, trace edema  Neurologic: Patient is alert although choosing not to open eyes or speak as this worsens her nausea, moves all extremities equally, and is following commands  Psychiatric: calm, cooperative            Data:   Imaging:  Reviewed.    EKG/Telemetry:  Reviewed.  EKG from 6/3/2019 showing NSR without ST elevation or depression    Labs: Reviewed.   Preoperative hemoglobin 12.4, creatinine 1.0, and glucose 103    Timoteo Adamson MD  [facility] Hospitalist  August 12, 2019      "

## 2019-08-12 NOTE — PLAN OF CARE
Pt arrived on floor at 1245 from PACU. Lethargic but oriented. On 3L O2. Pt very nauseous and had emesis x2. Given Compazine, Reglan, Scopalamine patch, Atarax, and IV Ativan throughout shift. Pt notes slight improvement but still intermittent nausea. Pt unable to attempt activity d/t nausea; will attempt standing later in evening if pt feeling better. Dressing CDI. [place]elton putting out adequate amounts, Hemovac compressed. Rates pain minimally, received scheduled Tylenol. Will continue to monitor.

## 2019-08-12 NOTE — ANESTHESIA POSTPROCEDURE EVALUATION
Patient: Sg Francisco    Procedure(s):  Left total knee arthroplasty    Diagnosis:djd  Diagnosis Additional Information: No value filed.    Anesthesia Type:  General, ETT, Periph. Nerve Block for postop pain    Note:  Anesthesia Post Evaluation    Patient location during evaluation: PACU  Patient participation: Able to fully participate in evaluation  Level of consciousness: awake  Pain management: adequate  Airway patency: patent  Cardiovascular status: acceptable  Respiratory status: acceptable  Hydration status: acceptable  PONV: none             Last vitals:  Vitals:    08/12/19 1000 08/12/19 1011 08/12/19 1015   BP: 119/64     Pulse:      Resp: 9  14   Temp: 97.1  F (36.2  C)     SpO2: 97% 98% 99%         Electronically Signed By: Fernando Brito MD  August 12, 2019  10:30 AM

## 2019-08-13 ENCOUNTER — APPOINTMENT (OUTPATIENT)
Dept: PHYSICAL THERAPY | Facility: CLINIC | Age: 69
DRG: 470 | End: 2019-08-13
Attending: ORTHOPAEDIC SURGERY
Payer: MEDICARE

## 2019-08-13 ENCOUNTER — APPOINTMENT (OUTPATIENT)
Dept: OCCUPATIONAL THERAPY | Facility: CLINIC | Age: 69
DRG: 470 | End: 2019-08-13
Attending: ORTHOPAEDIC SURGERY
Payer: MEDICARE

## 2019-08-13 VITALS
RESPIRATION RATE: 16 BRPM | SYSTOLIC BLOOD PRESSURE: 95 MMHG | HEART RATE: 79 BPM | TEMPERATURE: 97 F | DIASTOLIC BLOOD PRESSURE: 48 MMHG | OXYGEN SATURATION: 92 % | HEIGHT: 66 IN | BODY MASS INDEX: 44.84 KG/M2 | WEIGHT: 279 LBS

## 2019-08-13 LAB
GLUCOSE SERPL-MCNC: 111 MG/DL (ref 70–99)
HGB BLD-MCNC: 10.8 G/DL (ref 11.7–15.7)

## 2019-08-13 PROCEDURE — 97161 PT EVAL LOW COMPLEX 20 MIN: CPT | Mod: GP

## 2019-08-13 PROCEDURE — 25000132 ZZH RX MED GY IP 250 OP 250 PS 637: Mod: GY | Performed by: ORTHOPAEDIC SURGERY

## 2019-08-13 PROCEDURE — 97110 THERAPEUTIC EXERCISES: CPT | Mod: GP

## 2019-08-13 PROCEDURE — 97116 GAIT TRAINING THERAPY: CPT | Mod: GP,59

## 2019-08-13 PROCEDURE — 99232 SBSQ HOSP IP/OBS MODERATE 35: CPT | Performed by: INTERNAL MEDICINE

## 2019-08-13 PROCEDURE — 97165 OT EVAL LOW COMPLEX 30 MIN: CPT | Mod: GO

## 2019-08-13 PROCEDURE — 97116 GAIT TRAINING THERAPY: CPT | Mod: GP | Performed by: PHYSICAL THERAPY ASSISTANT

## 2019-08-13 PROCEDURE — 25000132 ZZH RX MED GY IP 250 OP 250 PS 637: Mod: GY | Performed by: PHYSICIAN ASSISTANT

## 2019-08-13 PROCEDURE — 85018 HEMOGLOBIN: CPT | Performed by: ORTHOPAEDIC SURGERY

## 2019-08-13 PROCEDURE — 36415 COLL VENOUS BLD VENIPUNCTURE: CPT | Performed by: ORTHOPAEDIC SURGERY

## 2019-08-13 PROCEDURE — 97530 THERAPEUTIC ACTIVITIES: CPT | Mod: GP | Performed by: PHYSICAL THERAPY ASSISTANT

## 2019-08-13 PROCEDURE — 97530 THERAPEUTIC ACTIVITIES: CPT | Mod: GP

## 2019-08-13 PROCEDURE — 25800030 ZZH RX IP 258 OP 636: Performed by: ORTHOPAEDIC SURGERY

## 2019-08-13 PROCEDURE — 97535 SELF CARE MNGMENT TRAINING: CPT | Mod: GO

## 2019-08-13 PROCEDURE — 97110 THERAPEUTIC EXERCISES: CPT | Mod: GP | Performed by: PHYSICAL THERAPY ASSISTANT

## 2019-08-13 PROCEDURE — 25000131 ZZH RX MED GY IP 250 OP 636 PS 637: Mod: GY | Performed by: ORTHOPAEDIC SURGERY

## 2019-08-13 PROCEDURE — 82947 ASSAY GLUCOSE BLOOD QUANT: CPT | Performed by: ORTHOPAEDIC SURGERY

## 2019-08-13 PROCEDURE — 25000128 H RX IP 250 OP 636: Performed by: ORTHOPAEDIC SURGERY

## 2019-08-13 RX ORDER — HYDROXYZINE HYDROCHLORIDE 10 MG/1
10 TABLET, FILM COATED ORAL EVERY 6 HOURS PRN
Qty: 60 TABLET | Refills: 0 | Status: SHIPPED | OUTPATIENT
Start: 2019-08-13 | End: 2019-10-21

## 2019-08-13 RX ORDER — OXYCODONE HYDROCHLORIDE 5 MG/1
TABLET ORAL
Qty: 50 TABLET | Refills: 0 | Status: SHIPPED | OUTPATIENT
Start: 2019-08-13 | End: 2019-10-21

## 2019-08-13 RX ORDER — AMOXICILLIN 250 MG
2 CAPSULE ORAL 2 TIMES DAILY PRN
Qty: 60 TABLET | Refills: 1 | Status: SHIPPED | OUTPATIENT
Start: 2019-08-13 | End: 2019-10-21

## 2019-08-13 RX ORDER — ACETAMINOPHEN 325 MG/1
975 TABLET ORAL EVERY 8 HOURS
Qty: 279 TABLET | Refills: 0 | Status: SHIPPED | OUTPATIENT
Start: 2019-08-13 | End: 2019-10-21

## 2019-08-13 RX ORDER — ONDANSETRON 4 MG/1
4 TABLET, ORALLY DISINTEGRATING ORAL EVERY 6 HOURS PRN
Qty: 30 TABLET | Refills: 1 | Status: SHIPPED | OUTPATIENT
Start: 2019-08-13 | End: 2019-10-21

## 2019-08-13 RX ADMIN — ONDANSETRON 4 MG: 4 TABLET, ORALLY DISINTEGRATING ORAL at 14:47

## 2019-08-13 RX ADMIN — HYDROXYZINE HYDROCHLORIDE 10 MG: 10 TABLET ORAL at 16:28

## 2019-08-13 RX ADMIN — OXYCODONE HYDROCHLORIDE 10 MG: 5 TABLET ORAL at 03:31

## 2019-08-13 RX ADMIN — HYDROXYZINE HYDROCHLORIDE 10 MG: 10 TABLET ORAL at 09:43

## 2019-08-13 RX ADMIN — OXYCODONE HYDROCHLORIDE 10 MG: 5 TABLET ORAL at 16:41

## 2019-08-13 RX ADMIN — CARBOXYMETHYLCELLULOSE SODIUM 1 DROP: 5 SOLUTION/ DROPS OPHTHALMIC at 07:58

## 2019-08-13 RX ADMIN — SENNOSIDES AND DOCUSATE SODIUM 1 TABLET: 8.6; 5 TABLET ORAL at 07:56

## 2019-08-13 RX ADMIN — CELECOXIB 200 MG: 200 CAPSULE ORAL at 07:57

## 2019-08-13 RX ADMIN — CARBOXYMETHYLCELLULOSE SODIUM 1 DROP: 5 SOLUTION/ DROPS OPHTHALMIC at 14:09

## 2019-08-13 RX ADMIN — OXYCODONE HYDROCHLORIDE 10 MG: 5 TABLET ORAL at 12:41

## 2019-08-13 RX ADMIN — FERROUS GLUCONATE 324 MG: 324 TABLET ORAL at 07:56

## 2019-08-13 RX ADMIN — DIFLUPREDNATE OPHTHALMIC 1 DROP: 0.5 EMULSION OPHTHALMIC at 07:58

## 2019-08-13 RX ADMIN — ONDANSETRON 4 MG: 4 TABLET, ORALLY DISINTEGRATING ORAL at 07:57

## 2019-08-13 RX ADMIN — SODIUM CHLORIDE, POTASSIUM CHLORIDE, SODIUM LACTATE AND CALCIUM CHLORIDE: 600; 310; 30; 20 INJECTION, SOLUTION INTRAVENOUS at 03:40

## 2019-08-13 RX ADMIN — ENOXAPARIN SODIUM 40 MG: 40 INJECTION SUBCUTANEOUS at 07:57

## 2019-08-13 RX ADMIN — ACETAMINOPHEN 975 MG: 325 TABLET, FILM COATED ORAL at 03:30

## 2019-08-13 RX ADMIN — OXYCODONE HYDROCHLORIDE 10 MG: 5 TABLET ORAL at 07:56

## 2019-08-13 RX ADMIN — ACETAMINOPHEN 975 MG: 325 TABLET, FILM COATED ORAL at 12:41

## 2019-08-13 ASSESSMENT — ACTIVITIES OF DAILY LIVING (ADL)
ADLS_ACUITY_SCORE: 13
ADLS_ACUITY_SCORE: 13
PREVIOUS_RESPONSIBILITIES: MEAL PREP;HOUSEKEEPING;MEDICATION MANAGEMENT;DRIVING
ADLS_ACUITY_SCORE: 14
ADLS_ACUITY_SCORE: 14
ADLS_ACUITY_SCORE: 13

## 2019-08-13 NOTE — PLAN OF CARE
OT: Order received, eval completed and treatment initiated. Pt is a 69 year old female s/p L TKA. Pt lives with spouse in a mobile trailer, 3 stairs w/ 2 grab bars, tub/shower w/ shower chair, RTS. Pt reports indep/mod I in all ADLs, IADLs and mobility tasks with use in cane in community.     Discharge Planner OT   Patient plan for discharge: Home  Current status: Pt completed bed mobility supine <> sit EOB with SBA, was able to utilize pants/UEs to assist LLE in/out of bed. Pt completed sit > stand from EOB to FWW with SBA, ambulated to/from BR FWW level x2 trials with SBA, performed toilet transfer on/off toilet with use of grab bar x2 trials with SBA. Pt completed toileting including hygiene and mgmt with distant supervision. Pt educated on compensatory technique for tub/shower transfer, required min A for mgmt of LLE in/out over edge of tub, spouse present and verbalized understanding in assisting upon returning home. Pt educated on compensatory technique for ease of LB dressing, required min A for sock mgmt. Pt able to complete grooming tasks in stance at sink with supervision. Pt and spouse educated on home safety modifications/recommendations, DME, car transfer technique and safe activities post TKA, verbalized understanding.   Barriers to return to prior living situation: Stairs  Recommendations for discharge: Home w/ spouse assist for LB dressing and IADLs (homemaking, driving, laundry)  Rationale for recommendations: Pt doing well. Anticipate pt will continue to make progress for safe discharge home. Pt has good support of spouse and daughter upon return home.        Entered by: Neisha Santos 08/13/2019 2:16 PM       Home with OP PT  Current status: Supine to sit with SBA. Sit to/from stand with CGA. Ambulates 100' with FWW and CGA progressing to SBA. Able to demonstrate step through gait with cues. Tolerates TKA exercises well. IND SLR; no KI needed.   Barriers to return to prior living situation: Stairs  - will address in PT.   Recommendations for discharge: Home with OP PT  Rationale for recommendations: Pt moving well. Will continue IP PT to maximize IND/safety with mobility prior to discharge.      Occupational Therapy Discharge Summary    Reason for therapy discharge:    Discharged to home.    Progress towards therapy goal(s). See goals on Care Plan in [facility] electronic health record for goal details.  Goals partially met.  Barriers to achieving goals:   goal completion adequate for discharge from OT .    Therapy recommendation(s):    No further skilled OT needs.

## 2019-08-13 NOTE — UTILIZATION REVIEW
Admission Status; Secondary Review Determination   Under the authority of the Utilization Management Committee, the utilization review process indicated a secondary review on the above patient. The review outcome is based on review of the medical records, discussions with staff, and applying clinical experience noted on the date of the review.     (x) Outpatient Status with extended recovery is appropriate - This patient does not meet hospital inpatient criteria. If this patient's primary payer is Medicare and was admitted as an inpatient, Condition Code 44 should be used and patient status changed to outpatient recovery.    RATIONALE FOR DETERMINATION    69 years old woman who underwent a L TKA. Patient was admitted to the hospital after the procedure. Patient has Medicare and the procedure is not on the CMS inpatient list. No documented complications occurred. Patient can be safely  monitored for bleeding and recover in outpatient/extended recovery setting.   The severity of illness, intensity of service provided, expected LOS and risk for adverse outcome doesn't meet inpatient hospital admission. Discussed with Elodia Werner.    The information on this document is developed by the utilization review team in order for the business office to ensure compliance. This only denotes the appropriateness of proper admission status and does not reflect the quality of care rendered.   The definitions of Inpatient Status and Observation Status used in making the determination above are those provided in the CMS Coverage Manual, Chapter 1 and Chapter 6, section 70.4.     Sincerely,   Miguelito Theodore MD    Physician Advisor  Utilization Review/ Case Management  [facility].

## 2019-08-13 NOTE — PROGRESS NOTES
Reviewed discharge instructions and medications with patient, spouse, and daughter. Questions answered. Patient discharged to home with spouse, discharge instructions, medications (Oxycodone, Atarax, Senna, Tylenol, and Zofran), and belongings at this time.

## 2019-08-13 NOTE — PROGRESS NOTES
08/13/19 1000   Quick Adds   Type of Visit Initial PT Evaluation   Living Environment   Lives With spouse   Living Arrangements house   Transportation Anticipated car, drives self;family or friend will provide   Living Environment Comment Pt lives in a mobile trailer. 3 CECILY. There are 2 grab bars to use.   Self-Care   Usual Activity Tolerance good   Current Activity Tolerance fair   Equipment Currently Used at Home cane, straight;walker, rolling  (4WW)   Functional Level Prior   Ambulation 1-->assistive equipment  (Cane)   Transferring 0-->independent   Toileting 0-->independent   Fall history within last six months no   General Information   Onset of Illness/Injury or Date of Surgery - Date 08/12/19   Referring Physician Bk BUSH   Patient/Family Goals Statement Return home with OP PT   Pertinent History of Current Problem (include personal factors and/or comorbidities that impact the POC) 69 year old female s/p L TKA.    Precautions/Limitations fall precautions   Weight-Bearing Status - LLE weight-bearing as tolerated   General Info Comments KI until SLR   Cognitive Status Examination   Orientation orientation to person, place and time   Level of Consciousness alert   Pain Assessment   Patient Currently in Pain Yes, see Vital Sign flowsheet  (2/10)   Integumentary/Edema   Integumentary/Edema Comments Dressing to L LE clean, dry and intact   Range of Motion (ROM)   ROM Comment Decreased L knee flexion/extension AROM. Other extremities WFLs.    Strength   Strength Comments IND SLR on the L LE   Bed Mobility   Bed Mobility Comments Supine to sit with SBA   Transfer Skills   Transfer Comments Sit to/from stand wtih CGA.    Gait   Gait Comments Ambulates 5' with FWW and CGA.    Balance   Balance Comments Requires bilat UE support on FWW for safe dynamic mobility   Modality Interventions   Planned Modality Interventions Cryotherapy   Planned Modality Interventions Comments PRN   General Therapy Interventions  "  Planned Therapy Interventions bed mobility training;gait training;ROM;strengthening;transfer training;home program guidelines;progressive activity/exercise   Clinical Impression   Criteria for Skilled Therapeutic Intervention yes, treatment indicated   PT Diagnosis Impaired gait   Influenced by the following impairments Pain, decreased L LE AROM/strength, impaired balance   Functional limitations due to impairments Decreased IND with transfers, gait and stairs   Clinical Presentation Stable/Uncomplicated   Clinical Presentation Rationale Stable.    Clinical Decision Making (Complexity) Low complexity   Therapy Frequency 2x/day   Predicted Duration of Therapy Intervention (days/wks) 3 days   Anticipated Discharge Disposition Home with Assist;Home with Outpatient Therapy   Risk & Benefits of therapy have been explained Yes   Patient, Family & other staff in agreement with plan of care Yes   [facility] TM \"6 Clicks\"   2016, Trustees of [facility], under license to [employer].  All rights reserved.   6 Clicks Short Forms Basic Mobility Inpatient Short Form   [facility]  \"6 Clicks\" V.2 Basic Mobility Inpatient Short Form   1. Turning from your back to your side while in a flat bed without using bedrails? 4 - None   2. Moving from lying on your back to sitting on the side of a flat bed without using bedrails? 3 - A Little   3. Moving to and from a bed to a chair (including a wheelchair)? 3 - A Little   4. Standing up from a chair using your arms (e.g., wheelchair, or bedside chair)? 3 - A Little   5. To walk in hospital room? 3 - A Little   6. Climbing 3-5 steps with a railing? 3 - A Little   Basic Mobility Raw Score (Score out of 24.Lower scores equate to lower levels of function) 19   Total Evaluation Time   Total Evaluation Time (Minutes) 5     "

## 2019-08-13 NOTE — PROGRESS NOTES
"Orthopedic Surgery  8/13/2019  POD 1    S: Patient voices no unexpected ortho complaints today. Denies chest pain or shortness of breath. Has had nausea and minimal ambulation.    O: Blood pressure 128/59, pulse 79, temperature 96.9  F (36.1  C), temperature source Oral, resp. rate 20, height 1.676 m (5' 5.98\"), weight 126.6 kg (279 lb), SpO2 94 %.  No results found for: HGB  No results found for: INR  I/O last 3 completed shifts:  In: 3341 [P.O.:200; I.V.:3141]  Out: 815 [Urine:625; Drains:190]  Distal extremity CMSI bilaterally.  Calves are negative bilaterally, both soft and nontender.  The dressing is C/D/I.      A: Ms. Francisco is doing well status post Procedure(s):  Left total knee arthroplasty.    P: Continue physical therapy. Continue DVT pphx with Lovenox. Anticipate discharge to home on POD  1 or 2 depending ability to complete and pass PT today.    Elodia Werner PA-C  [phone]  "

## 2019-08-13 NOTE — PROGRESS NOTES
"[facility]  Hospitalist Progress Note  Patient Name: Sg Francisco    MRN: [id number]  Provider: Hernandez Daley MD  08/13/19    Initial presenting complaint/issue to hospital (Diagnosis): total knee arthroplasty         Assessment and Plan:      Summary:  Sg Francisco is a 69 year old female with history of obesity, HTN, breast cancer, endometriosis, and osteoarthritis of the knees. She was admitted on 8/12/2019 following elective left TKA by Dr. Bourgeois.  After surgery she had significant nausea but pain was relatively easy to control.  The hospitalist service was consulted to co-manage chronic medical problems.      Problem list:    1.  Osteoarthritis bilateral knees:  s/p elective left TKA on 8/12/2019 by Dr. Bourgeois.  Sg is doing well. Pain control is adequate. She is dong well with therapy.      2.  Postop nausea: Improving. Well controlled with Zofran which is being prescribed on discharge.      3.  HTN: Continue PTA Maxzide 37.5-25 mg daily.       4.  Morbid obesity: BMI 45.     5.  History of breast cancer: Previous left-sided breast cancer with duct removal surgery.     6.  History of endometriosis: Previous hysterectomy.     7.  Cataracts: Resumed home eyedrops.  She can use her own supply.     Code status: Full code  DVT prophylaxis: Lovenox and PCD's  Disposition: likely home today if passes PT        Interval History:      Nausea is much better. No new problems. Pain is pretty well controlled.                   Physical Exam:      Last Vital Signs:  BP 96/50   Pulse 79   Temp 97.5  F (36.4  C) (Oral)   Resp 14   Ht 1.676 m (5' 5.98\")   Wt 126.6 kg (279 lb)   SpO2 92%   BMI 45.05 kg/m      Intake/Output Summary (Last 24 hours) at 8/13/2019 1417  Last data filed at 8/13/2019 1400  Gross per 24 hour   Intake 3070 ml   Output 715 ml   Net 2355 ml       GENERAL:  Comfortable. Cooperative.  PSYCH: pleasant, oriented, No acute distress.  EYES: PERRLA, Normal conjunctiva.  HEART:  Regular rate " and rhythm. No JVD. Pulses normal. No edema.  LUNGS:  Clear to auscultation, normal Respiratory effort.  ABDOMEN:  Soft, no hepatosplenomegaly, normal bowel sounds.  EXTREMETIES: No clubbing, cyanosis or ischemia  SKIN:  Dry to touch, No rash.           Medications:      All current medications were reviewed.         Data:      All new lab and imaging data was reviewed.   Labs:       No results found for: NA No results found for: CHLORIDE No results found for: BUN   No results found for: POTASSIUM No results found for: CO2 Lab Results   Component Value Date    CR 0.95 08/12/2019        Recent Labs   Lab 08/13/19  0734 08/12/19  1655   HGB 10.8*  --    PLT  --  351

## 2019-08-13 NOTE — PLAN OF CARE
VSS. Pain managed with oral medications. Intermittent nausea. Received antiemetic. Pt has not wanted to do much due to nausea. Dressing is intact. Hemovac and lin in place.

## 2019-08-13 NOTE — PROGRESS NOTES
08/13/19 1334   Quick Adds   Type of Visit Initial Occupational Therapy Evaluation   Living Environment   Lives With spouse   Living Arrangements house   Transportation Anticipated car, drives self;family or friend will provide   Living Environment Comment Pt lives with spouse in a mobile home, 3 stairs w/ 2 grab bars, tub/shower w/ shower chair, RTS.    Self-Care   Usual Activity Tolerance good   Current Activity Tolerance moderate   Equipment Currently Used at Home cane, straight;walker, rolling   Functional Level   Ambulation 1-->assistive equipment  (cane)   Transferring 0-->independent   Toileting 0-->independent   Bathing 0-->independent   Dressing 0-->independent   Eating 0-->independent   Communication 0-->understands/communicates without difficulty   Swallowing 0-->swallows foods/liquids without difficulty   Cognition 0 - no cognition issues reported   Fall history within last six months no   Which of the above functional risks had a recent onset or change? transferring;toileting;bathing;dressing   Prior Functional Level Comment Pt reports indep/mod I in all ADLs, IADLs and mobility tasks with use in cane in community.    General Information   Onset of Illness/Injury or Date of Surgery - Date 08/12/19   Referring Physician Clay Bourgeois MD   Patient/Family Goals Statement Pt's goal is to d/c home   Additional Occupational Profile Info/Pertinent History of Current Problem Per chart: Pt is a 69 year old female s/p L TKA   Precautions/Limitations fall precautions   Weight-Bearing Status - LLE weight-bearing as tolerated   Sensory Examination   Sensory Comments Pt denies numbness/tingling in BUEs   Pain Assessment   Patient Currently in Pain Yes, see Vital Sign flowsheet  (4/10 pain in RLE)   Range of Motion (ROM)   ROM Comment WFL   Strength   Strength Comments WFL   Hand Strength   Hand Strength Comments WFL   Bed Mobility Skill: Sit to Supine   Level of [place]: Sit/Supine stand-by assist    Physical Assist/Nonphysical Assist: Sit/Supine 1 person assist   Bed Mobility Skill: Supine to Sit   Level of [place]: Supine/Sit stand-by assist   Physical Assist/Nonphysical Assist: Supine/Sit 1 person assist   Transfer Skill: Bed to Chair/Chair to Bed   Level of [place]: Bed to Chair stand-by assist   Physical Assist/Nonphysical Assist: Bed to Chair 1 person assist   Weight-Bearing Restrictions weight-bearing as tolerated   Assistive Device - Transfer Skill Bed to Chair Chair to Bed Rehab Eval rolling walker   Transfer Skill: Sit to Stand   Level of [place]: Sit/Stand stand-by assist   Physical Assist/Nonphysical Assist: Sit/Stand 1 person assist   Transfer Skill: Sit to Stand weight-bearing as tolerated   Assistive Device for Transfer: Sit/Stand rolling walker   Transfer Skill: Toilet Transfer   Level of [place]: Toilet stand-by assist   Physical Assist/Nonphysical Assist: Toilet 1 person assist   Weight-Bearing Restrictions: Toilet weight-bearing as tolerated   Assistive Device rolling walker   Balance   Balance Comments SBA while in stance FWW level   Upper Body Dressing   Level of [place]: Dress Upper Body stand-by assist   Physical Assist/Nonphysical Assist: Dress Upper Body 1 person assist   Lower Body Dressing   Level of [place]: Dress Lower Body minimum assist (75% patients effort)   Physical Assist/Nonphysical Assist: Dress Lower Body 1 person assist   Toileting   Level of [place]: Toilet stand-by assist   Physical Assist/Nonphysical Assist: Toilet 1 person assist   Grooming   Level of [place]: Grooming stand-by assist   Physical Assist/Nonphysical Assist: Grooming supervision;1 person assist   Instrumental Activities of Daily Living (IADL)   Previous Responsibilities meal prep;housekeeping;medication management;driving   IADL Comments Spouse available to assist as needed in home environment   Activities of Daily Living Analysis   Impairments Contributing to  "Impaired Activities of Daily Living pain;strength decreased   ADL Comments Pt presents to OT below baseline level of functioning in all areas of self cares including bathing, dressing, grooming and toileting   General Therapy Interventions   Planned Therapy Interventions ADL retraining;IADL retraining;transfer training   Clinical Impression   Criteria for Skilled Therapeutic Interventions Met yes, treatment indicated   OT Diagnosis Impaired ADLs, IADLs and mobility tasks   Influenced by the following impairments Decreased functional activity tolerance, pain   Assessment of Occupational Performance 5 or more Performance Deficits   Identified Performance Deficits Bathing, dressing, grooming, toileting, homemaking, transfers   Clinical Decision Making (Complexity) Low complexity   Therapy Frequency Daily   Predicted Duration of Therapy Intervention (days/wks) eval and 1x treat   Anticipated Discharge Disposition Home with Assist   Risks and Benefits of Treatment have been explained. Yes   Patient, Family & other staff in agreement with plan of care Yes   Clinical Impression Comments Pt would benefit from skilled OT to maximize safety and indep in all ADLs, IADLs and mobility tasks due to current deficits impacting function    [facility]AC  \"6 Clicks\" Daily Activity Inpatient Short Form   1. Putting on and taking off regular lower body clothing? 3 - A Little   2. Bathing (including washing, rinsing, drying)? 3 - A Little   3. Toileting, which includes using toilet, bedpan or urinal? 4 - None   4. Putting on and taking off regular upper body clothing? 4 - None   5. Taking care of personal grooming such as brushing teeth? 4 - None   6. Eating meals? 4 - None   Daily Activity Raw Score (Score out of 24.Lower scores equate to lower levels of function) 22   Total Evaluation Time   Total Evaluation Time (Minutes) 10     "

## 2019-08-13 NOTE — PLAN OF CARE
PT- goals met after PM session. Able to walk with 4ww as she has at home with no issues noted education on when to wean and suggests waiting until OP PT starts later this week. Recommend to PT for discharge.

## 2019-08-13 NOTE — PLAN OF CARE
PT: Orders received, evaluation completed and treatment initiated. 69 year old female s/p L TKA. Pt reports mod I with cane at baseline. Pt lives in a mobile trailer outside her daughters home. There are three stairs (bilateral grab bar) to enter.  is in good health and can assist as needed on discharge. Pt has a 4WW.     Discharge Planner PT   Patient plan for discharge: Home with OP PT  Current status: Supine to sit with SBA. Sit to/from stand with CGA. Ambulates 100' with FWW and CGA progressing to SBA. Able to demonstrate step through gait with cues. Tolerates TKA exercises well. IND SLR; no KI needed.   Barriers to return to prior living situation: Stairs - will address in PT.   Recommendations for discharge: Home with OP PT  Rationale for recommendations: Pt moving well. Will continue IP PT to maximize IND/safety with mobility prior to discharge.       Entered by: Rich Mannl 08/13/2019 10:48 AM

## 2019-08-20 NOTE — DISCHARGE SUMMARY
Diagnosis: left DJD knee  Procedure: left Cemented total knee replacement  Surgeon: Clay Bourgeois MD  Patient underwent total knee replacement without complication. Patient had typical transient post-op anemia. Patient's hospital stay included physical therapy and DVT prophylaxis. After discussion with patient regarding no history of DVT and current high mobility, patient is discharged with ASA for home DVT pphx. Patient will follow -up in the office 10-14days post-op for wound check. Please refer to chart for any other specifics of this hospital stay.  Elodia Werner PA-C

## 2019-10-21 ENCOUNTER — HOSPITAL ENCOUNTER (OUTPATIENT)
Dept: LAB | Facility: CLINIC | Age: 69
Discharge: HOME | End: 2019-10-21
Attending: ORTHOPAEDIC SURGERY | Admitting: ORTHOPAEDIC SURGERY
Payer: MEDICARE

## 2019-10-21 DIAGNOSIS — Z01.812 PRE-OPERATIVE LABORATORY EXAMINATION: ICD-10-CM

## 2019-10-21 DIAGNOSIS — Z01.812 PRE-OPERATIVE LABORATORY EXAMINATION: Primary | ICD-10-CM

## 2019-10-21 PROCEDURE — 40000830 ZZHCL STATISTIC STAPH AUREUS METH RESIST SCREEN PCR: Performed by: ORTHOPAEDIC SURGERY

## 2019-10-21 PROCEDURE — 87640 STAPH A DNA AMP PROBE: CPT | Performed by: ORTHOPAEDIC SURGERY

## 2019-10-21 PROCEDURE — 40000829 ZZHCL STATISTIC STAPH AUREUS SUSCEPT SCREEN PCR: Performed by: ORTHOPAEDIC SURGERY

## 2019-10-21 PROCEDURE — 87641 MR-STAPH DNA AMP PROBE: CPT | Performed by: ORTHOPAEDIC SURGERY

## 2019-10-21 RX ORDER — ACETAMINOPHEN 500 MG
1500 TABLET ORAL AT BEDTIME
COMMUNITY

## 2019-10-21 ASSESSMENT — MIFFLIN-ST. JEOR: SCORE: 1803.88

## 2019-10-22 LAB
MRSA DNA SPEC QL NAA+PROBE: NEGATIVE
SPECIMEN SOURCE: NORMAL

## 2019-11-02 NOTE — PHARMACY-ADMISSION MEDICATION HISTORY
Admission medication history interview status for this patient is complete. See [facility] admission navigator for allergy information, prior to admission medications and immunization status.     PTA meds completed by pre-admitting nurse Evelyn Garcia and reviewed by pharmacy       Prior to Admission medications    Medication Sig Last Dose Taking? Auth Provider   acetaminophen (TYLENOL) 500 MG tablet Take 1,500 mg by mouth At Bedtime  Yes Reported, Patient   carboxymethylcellulose PF (REFRESH PLUS) 0.5 % ophthalmic solution Place 1 drop into both eyes 4 times daily as needed for dry eyes  Yes Reported, Patient   travoprost BAK FREE (TRAVATAN Z) 0.004 % ophthalmic solution Place 1 drop into both eyes At Bedtime  Yes Reported, Patient   triamterene-HCTZ (MAXZIDE-25) 37.5-25 MG tablet Take 1 tablet by mouth daily  Yes Reported, Patient   TURMERIC PO Take by mouth daily  Yes Reported, Patient

## 2019-11-04 ENCOUNTER — ANESTHESIA (OUTPATIENT)
Dept: SURGERY | Facility: CLINIC | Age: 69
End: 2019-11-04
Payer: MEDICARE

## 2019-11-04 ENCOUNTER — APPOINTMENT (OUTPATIENT)
Dept: PHYSICAL THERAPY | Facility: CLINIC | Age: 69
End: 2019-11-04
Attending: ORTHOPAEDIC SURGERY
Payer: MEDICARE

## 2019-11-04 ENCOUNTER — ANESTHESIA EVENT (OUTPATIENT)
Dept: SURGERY | Facility: CLINIC | Age: 69
End: 2019-11-04
Payer: MEDICARE

## 2019-11-04 ENCOUNTER — HOSPITAL ENCOUNTER (OUTPATIENT)
Facility: CLINIC | Age: 69
LOS: 1 days | Discharge: HOME | End: 2019-11-05
Attending: ORTHOPAEDIC SURGERY | Admitting: ORTHOPAEDIC SURGERY
Payer: MEDICARE

## 2019-11-04 ENCOUNTER — APPOINTMENT (OUTPATIENT)
Dept: GENERAL RADIOLOGY | Facility: CLINIC | Age: 69
End: 2019-11-04
Attending: ORTHOPAEDIC SURGERY
Payer: MEDICARE

## 2019-11-04 DIAGNOSIS — Z96.651 STATUS POST TOTAL RIGHT KNEE REPLACEMENT: ICD-10-CM

## 2019-11-04 DIAGNOSIS — Z96.659 HISTORY OF TOTAL KNEE REPLACEMENT, UNSPECIFIED LATERALITY: Primary | ICD-10-CM

## 2019-11-04 LAB
CREAT SERPL-MCNC: 0.9 MG/DL (ref 0.52–1.04)
CREAT SERPL-MCNC: 0.91 MG/DL (ref 0.52–1.04)
GFR SERPL CREATININE-BSD FRML MDRD: 64 ML/MIN/{1.73_M2}
GFR SERPL CREATININE-BSD FRML MDRD: 65 ML/MIN/{1.73_M2}
PLATELET # BLD AUTO: 367 10E9/L (ref 150–450)
POTASSIUM SERPL-SCNC: 3.6 MMOL/L (ref 3.4–5.3)

## 2019-11-04 PROCEDURE — 25000128 H RX IP 250 OP 636: Performed by: ANESTHESIOLOGY

## 2019-11-04 PROCEDURE — 97161 PT EVAL LOW COMPLEX 20 MIN: CPT | Mod: GP

## 2019-11-04 PROCEDURE — 25800025 ZZH RX 258: Performed by: ORTHOPAEDIC SURGERY

## 2019-11-04 PROCEDURE — 82565 ASSAY OF CREATININE: CPT | Mod: 91 | Performed by: ANESTHESIOLOGY

## 2019-11-04 PROCEDURE — 37000009 ZZH ANESTHESIA TECHNICAL FEE, EACH ADDTL 15 MIN: Performed by: ORTHOPAEDIC SURGERY

## 2019-11-04 PROCEDURE — 84132 ASSAY OF SERUM POTASSIUM: CPT | Performed by: ANESTHESIOLOGY

## 2019-11-04 PROCEDURE — 25000125 ZZHC RX 250: Performed by: PHYSICIAN ASSISTANT

## 2019-11-04 PROCEDURE — 36000063 ZZH SURGERY LEVEL 4 EA 15 ADDTL MIN: Performed by: ORTHOPAEDIC SURGERY

## 2019-11-04 PROCEDURE — 85049 AUTOMATED PLATELET COUNT: CPT | Performed by: ORTHOPAEDIC SURGERY

## 2019-11-04 PROCEDURE — 71000013 ZZH RECOVERY PHASE 1 LEVEL 1 EA ADDTL HR: Performed by: ORTHOPAEDIC SURGERY

## 2019-11-04 PROCEDURE — 37000008 ZZH ANESTHESIA TECHNICAL FEE, 1ST 30 MIN: Performed by: ORTHOPAEDIC SURGERY

## 2019-11-04 PROCEDURE — 36415 COLL VENOUS BLD VENIPUNCTURE: CPT | Performed by: ORTHOPAEDIC SURGERY

## 2019-11-04 PROCEDURE — 40000986 XR KNEE PORT RT 1/2 VW: Mod: RT

## 2019-11-04 PROCEDURE — 25000125 ZZHC RX 250: Performed by: ORTHOPAEDIC SURGERY

## 2019-11-04 PROCEDURE — 71000012 ZZH RECOVERY PHASE 1 LEVEL 1 FIRST HR: Performed by: ORTHOPAEDIC SURGERY

## 2019-11-04 PROCEDURE — 25000132 ZZH RX MED GY IP 250 OP 250 PS 637: Mod: GY | Performed by: ORTHOPAEDIC SURGERY

## 2019-11-04 PROCEDURE — 25000125 ZZHC RX 250: Performed by: NURSE ANESTHETIST, CERTIFIED REGISTERED

## 2019-11-04 PROCEDURE — 25000128 H RX IP 250 OP 636: Performed by: ORTHOPAEDIC SURGERY

## 2019-11-04 PROCEDURE — 27210794 ZZH OR GENERAL SUPPLY STERILE: Performed by: ORTHOPAEDIC SURGERY

## 2019-11-04 PROCEDURE — 82565 ASSAY OF CREATININE: CPT | Performed by: ORTHOPAEDIC SURGERY

## 2019-11-04 PROCEDURE — 36000093 ZZH SURGERY LEVEL 4 1ST 30 MIN: Performed by: ORTHOPAEDIC SURGERY

## 2019-11-04 PROCEDURE — 25000128 H RX IP 250 OP 636: Performed by: NURSE ANESTHETIST, CERTIFIED REGISTERED

## 2019-11-04 PROCEDURE — 40000306 ZZH STATISTIC PRE PROC ASSESS II: Performed by: ORTHOPAEDIC SURGERY

## 2019-11-04 PROCEDURE — C1776 JOINT DEVICE (IMPLANTABLE): HCPCS | Performed by: ORTHOPAEDIC SURGERY

## 2019-11-04 PROCEDURE — 36415 COLL VENOUS BLD VENIPUNCTURE: CPT | Performed by: ANESTHESIOLOGY

## 2019-11-04 PROCEDURE — 99207 ZZC CDG-CODE CATEGORY CHANGED: CPT | Performed by: INTERNAL MEDICINE

## 2019-11-04 PROCEDURE — 25000128 H RX IP 250 OP 636: Performed by: PHYSICIAN ASSISTANT

## 2019-11-04 PROCEDURE — 99218 ZZC INITIAL OBSERVATION CARE,LEVL I: CPT | Performed by: INTERNAL MEDICINE

## 2019-11-04 PROCEDURE — 27810169 ZZH OR IMPLANT GENERAL: Performed by: ORTHOPAEDIC SURGERY

## 2019-11-04 PROCEDURE — 25800030 ZZH RX IP 258 OP 636: Performed by: ANESTHESIOLOGY

## 2019-11-04 PROCEDURE — 25800030 ZZH RX IP 258 OP 636: Performed by: ORTHOPAEDIC SURGERY

## 2019-11-04 DEVICE — IMP ART SURFACE ZIM NEXGEN LPS EF 3-4 10MM 00-5964-032-10: Type: IMPLANTABLE DEVICE | Site: KNEE | Status: FUNCTIONAL

## 2019-11-04 DEVICE — IMP COMP TIBIAL STEMMED ZIM NEXGEN SIZE 3 5980-37-01: Type: IMPLANTABLE DEVICE | Site: KNEE | Status: FUNCTIONAL

## 2019-11-04 DEVICE — IMP COMP FEMORAL F RT 00-5764-016-52: Type: IMPLANTABLE DEVICE | Site: KNEE | Status: FUNCTIONAL

## 2019-11-04 DEVICE — IMP COMP PATELLA ZIM NEXGEN 8.5X32MM 00-5972-065-32: Type: IMPLANTABLE DEVICE | Site: KNEE | Status: FUNCTIONAL

## 2019-11-04 DEVICE — BONE CEMENT SIMPLEX W/TOBRAMYCIN 6197-9-001: Type: IMPLANTABLE DEVICE | Site: KNEE | Status: FUNCTIONAL

## 2019-11-04 RX ORDER — HYDROMORPHONE HYDROCHLORIDE 1 MG/ML
0.2 INJECTION, SOLUTION INTRAMUSCULAR; INTRAVENOUS; SUBCUTANEOUS
Status: DISCONTINUED | OUTPATIENT
Start: 2019-11-04 | End: 2019-11-04

## 2019-11-04 RX ORDER — SODIUM CHLORIDE, SODIUM LACTATE, POTASSIUM CHLORIDE, CALCIUM CHLORIDE 600; 310; 30; 20 MG/100ML; MG/100ML; MG/100ML; MG/100ML
INJECTION, SOLUTION INTRAVENOUS CONTINUOUS
Status: DISCONTINUED | OUTPATIENT
Start: 2019-11-04 | End: 2019-11-04 | Stop reason: HOSPADM

## 2019-11-04 RX ORDER — ONDANSETRON 2 MG/ML
4 INJECTION INTRAMUSCULAR; INTRAVENOUS EVERY 30 MIN PRN
Status: DISCONTINUED | OUTPATIENT
Start: 2019-11-04 | End: 2019-11-04 | Stop reason: HOSPADM

## 2019-11-04 RX ORDER — DIMENHYDRINATE 50 MG/ML
25 INJECTION, SOLUTION INTRAMUSCULAR; INTRAVENOUS
Status: DISCONTINUED | OUTPATIENT
Start: 2019-11-04 | End: 2019-11-04 | Stop reason: HOSPADM

## 2019-11-04 RX ORDER — OXYCODONE HYDROCHLORIDE 5 MG/1
5-10 TABLET ORAL
Status: DISCONTINUED | OUTPATIENT
Start: 2019-11-04 | End: 2019-11-05 | Stop reason: HOSPADM

## 2019-11-04 RX ORDER — TRAVOPROST OPHTHALMIC SOLUTION 0.04 MG/ML
1 SOLUTION OPHTHALMIC AT BEDTIME
Status: DISCONTINUED | OUTPATIENT
Start: 2019-11-04 | End: 2019-11-05 | Stop reason: HOSPADM

## 2019-11-04 RX ORDER — LIDOCAINE 40 MG/G
CREAM TOPICAL
Status: DISCONTINUED | OUTPATIENT
Start: 2019-11-04 | End: 2019-11-05 | Stop reason: HOSPADM

## 2019-11-04 RX ORDER — NALOXONE HYDROCHLORIDE 0.4 MG/ML
.1-.4 INJECTION, SOLUTION INTRAMUSCULAR; INTRAVENOUS; SUBCUTANEOUS
Status: DISCONTINUED | OUTPATIENT
Start: 2019-11-04 | End: 2019-11-05 | Stop reason: HOSPADM

## 2019-11-04 RX ORDER — ONDANSETRON 2 MG/ML
4 INJECTION INTRAMUSCULAR; INTRAVENOUS EVERY 6 HOURS PRN
Status: DISCONTINUED | OUTPATIENT
Start: 2019-11-04 | End: 2019-11-05 | Stop reason: HOSPADM

## 2019-11-04 RX ORDER — ONDANSETRON 2 MG/ML
INJECTION INTRAMUSCULAR; INTRAVENOUS PRN
Status: DISCONTINUED | OUTPATIENT
Start: 2019-11-04 | End: 2019-11-04

## 2019-11-04 RX ORDER — KETOROLAC TROMETHAMINE 15 MG/ML
15 INJECTION, SOLUTION INTRAMUSCULAR; INTRAVENOUS EVERY 6 HOURS
Status: COMPLETED | OUTPATIENT
Start: 2019-11-04 | End: 2019-11-05

## 2019-11-04 RX ORDER — TRIAMTERENE/HYDROCHLOROTHIAZID 37.5-25 MG
1 TABLET ORAL DAILY
Status: DISCONTINUED | OUTPATIENT
Start: 2019-11-05 | End: 2019-11-05 | Stop reason: HOSPADM

## 2019-11-04 RX ORDER — SCOLOPAMINE TRANSDERMAL SYSTEM 1 MG/1
1 PATCH, EXTENDED RELEASE TRANSDERMAL
Status: DISCONTINUED | OUTPATIENT
Start: 2019-11-04 | End: 2019-11-04 | Stop reason: HOSPADM

## 2019-11-04 RX ORDER — ONDANSETRON 4 MG/1
4 TABLET, ORALLY DISINTEGRATING ORAL EVERY 6 HOURS PRN
Status: DISCONTINUED | OUTPATIENT
Start: 2019-11-04 | End: 2019-11-05 | Stop reason: HOSPADM

## 2019-11-04 RX ORDER — CEFAZOLIN SODIUM 1 G/3ML
1 INJECTION, POWDER, FOR SOLUTION INTRAMUSCULAR; INTRAVENOUS SEE ADMIN INSTRUCTIONS
Status: DISCONTINUED | OUTPATIENT
Start: 2019-11-04 | End: 2019-11-04 | Stop reason: HOSPADM

## 2019-11-04 RX ORDER — FENTANYL CITRATE 50 UG/ML
25-50 INJECTION, SOLUTION INTRAMUSCULAR; INTRAVENOUS
Status: DISCONTINUED | OUTPATIENT
Start: 2019-11-04 | End: 2019-11-04 | Stop reason: HOSPADM

## 2019-11-04 RX ORDER — GLYCOPYRROLATE 0.2 MG/ML
INJECTION, SOLUTION INTRAMUSCULAR; INTRAVENOUS PRN
Status: DISCONTINUED | OUTPATIENT
Start: 2019-11-04 | End: 2019-11-04

## 2019-11-04 RX ORDER — CARBOXYMETHYLCELLULOSE SODIUM 5 MG/ML
1 SOLUTION/ DROPS OPHTHALMIC 4 TIMES DAILY PRN
Status: DISCONTINUED | OUTPATIENT
Start: 2019-11-04 | End: 2019-11-05 | Stop reason: HOSPADM

## 2019-11-04 RX ORDER — FENTANYL CITRATE 50 UG/ML
25-50 INJECTION, SOLUTION INTRAMUSCULAR; INTRAVENOUS
Status: DISCONTINUED | OUTPATIENT
Start: 2019-11-04 | End: 2019-11-04 | Stop reason: CLARIF

## 2019-11-04 RX ORDER — PROPOFOL 10 MG/ML
INJECTION, EMULSION INTRAVENOUS CONTINUOUS PRN
Status: DISCONTINUED | OUTPATIENT
Start: 2019-11-04 | End: 2019-11-04

## 2019-11-04 RX ORDER — KETAMINE HYDROCHLORIDE 10 MG/ML
INJECTION INTRAMUSCULAR; INTRAVENOUS PRN
Status: DISCONTINUED | OUTPATIENT
Start: 2019-11-04 | End: 2019-11-04

## 2019-11-04 RX ORDER — MEPERIDINE HYDROCHLORIDE 50 MG/ML
12.5 INJECTION INTRAMUSCULAR; INTRAVENOUS; SUBCUTANEOUS
Status: DISCONTINUED | OUTPATIENT
Start: 2019-11-04 | End: 2019-11-04 | Stop reason: HOSPADM

## 2019-11-04 RX ORDER — SODIUM CHLORIDE, SODIUM LACTATE, POTASSIUM CHLORIDE, CALCIUM CHLORIDE 600; 310; 30; 20 MG/100ML; MG/100ML; MG/100ML; MG/100ML
INJECTION, SOLUTION INTRAVENOUS CONTINUOUS
Status: DISCONTINUED | OUTPATIENT
Start: 2019-11-04 | End: 2019-11-05 | Stop reason: HOSPADM

## 2019-11-04 RX ORDER — HYDROXYZINE HYDROCHLORIDE 10 MG/1
10 TABLET, FILM COATED ORAL EVERY 6 HOURS PRN
Status: DISCONTINUED | OUTPATIENT
Start: 2019-11-04 | End: 2019-11-05 | Stop reason: HOSPADM

## 2019-11-04 RX ORDER — LIDOCAINE 40 MG/G
CREAM TOPICAL
Status: DISCONTINUED | OUTPATIENT
Start: 2019-11-04 | End: 2019-11-04 | Stop reason: HOSPADM

## 2019-11-04 RX ORDER — HYDROMORPHONE HYDROCHLORIDE 1 MG/ML
.3-.5 INJECTION, SOLUTION INTRAMUSCULAR; INTRAVENOUS; SUBCUTANEOUS EVERY 10 MIN PRN
Status: DISCONTINUED | OUTPATIENT
Start: 2019-11-04 | End: 2019-11-04 | Stop reason: HOSPADM

## 2019-11-04 RX ORDER — CEFAZOLIN SODIUM IN 0.9 % NACL 3 G/100 ML
3 INTRAVENOUS SOLUTION, PIGGYBACK (ML) INTRAVENOUS
Status: COMPLETED | OUTPATIENT
Start: 2019-11-04 | End: 2019-11-04

## 2019-11-04 RX ORDER — CALCIUM CARBONATE 500 MG/1
1000 TABLET, CHEWABLE ORAL 4 TIMES DAILY PRN
Status: DISCONTINUED | OUTPATIENT
Start: 2019-11-04 | End: 2019-11-05 | Stop reason: HOSPADM

## 2019-11-04 RX ORDER — DEXAMETHASONE SODIUM PHOSPHATE 4 MG/ML
INJECTION, SOLUTION INTRA-ARTICULAR; INTRALESIONAL; INTRAMUSCULAR; INTRAVENOUS; SOFT TISSUE PRN
Status: DISCONTINUED | OUTPATIENT
Start: 2019-11-04 | End: 2019-11-04

## 2019-11-04 RX ORDER — ALBUTEROL SULFATE 0.83 MG/ML
2.5 SOLUTION RESPIRATORY (INHALATION) EVERY 4 HOURS PRN
Status: DISCONTINUED | OUTPATIENT
Start: 2019-11-04 | End: 2019-11-04 | Stop reason: HOSPADM

## 2019-11-04 RX ORDER — NALOXONE HYDROCHLORIDE 0.4 MG/ML
.1-.4 INJECTION, SOLUTION INTRAMUSCULAR; INTRAVENOUS; SUBCUTANEOUS
Status: DISCONTINUED | OUTPATIENT
Start: 2019-11-04 | End: 2019-11-04 | Stop reason: HOSPADM

## 2019-11-04 RX ORDER — ACETAMINOPHEN 325 MG/1
975 TABLET ORAL EVERY 8 HOURS
Status: DISCONTINUED | OUTPATIENT
Start: 2019-11-04 | End: 2019-11-05 | Stop reason: HOSPADM

## 2019-11-04 RX ORDER — ONDANSETRON 4 MG/1
4 TABLET, ORALLY DISINTEGRATING ORAL EVERY 30 MIN PRN
Status: DISCONTINUED | OUTPATIENT
Start: 2019-11-04 | End: 2019-11-04 | Stop reason: HOSPADM

## 2019-11-04 RX ORDER — CEFAZOLIN SODIUM 2 G/100ML
2 INJECTION, SOLUTION INTRAVENOUS EVERY 8 HOURS
Status: COMPLETED | OUTPATIENT
Start: 2019-11-04 | End: 2019-11-05

## 2019-11-04 RX ORDER — TRAVOPROST OPHTHALMIC SOLUTION 0.04 MG/ML
1 SOLUTION OPHTHALMIC AT BEDTIME
Status: DISCONTINUED | OUTPATIENT
Start: 2019-11-04 | End: 2019-11-04

## 2019-11-04 RX ADMIN — ACETAMINOPHEN 975 MG: 325 TABLET, FILM COATED ORAL at 21:07

## 2019-11-04 RX ADMIN — GLYCOPYRROLATE 0.1 MG: 0.2 INJECTION, SOLUTION INTRAMUSCULAR; INTRAVENOUS at 10:25

## 2019-11-04 RX ADMIN — ONDANSETRON HYDROCHLORIDE 4 MG: 2 INJECTION, SOLUTION INTRAVENOUS at 11:51

## 2019-11-04 RX ADMIN — ACETAMINOPHEN 975 MG: 325 TABLET, FILM COATED ORAL at 14:32

## 2019-11-04 RX ADMIN — KETOROLAC TROMETHAMINE 15 MG: 15 INJECTION, SOLUTION INTRAMUSCULAR; INTRAVENOUS at 14:32

## 2019-11-04 RX ADMIN — KETOROLAC TROMETHAMINE 15 MG: 15 INJECTION, SOLUTION INTRAMUSCULAR; INTRAVENOUS at 21:05

## 2019-11-04 RX ADMIN — CEFAZOLIN SODIUM 2 G: 2 INJECTION, SOLUTION INTRAVENOUS at 17:55

## 2019-11-04 RX ADMIN — OXYCODONE HYDROCHLORIDE 5 MG: 5 TABLET ORAL at 17:52

## 2019-11-04 RX ADMIN — Medication 10 MG: at 10:29

## 2019-11-04 RX ADMIN — Medication 3 G: at 10:16

## 2019-11-04 RX ADMIN — Medication 1 G: at 10:25

## 2019-11-04 RX ADMIN — SCOPOLAMINE 1 PATCH: 1 PATCH TRANSDERMAL at 08:40

## 2019-11-04 RX ADMIN — PROPOFOL 100 MCG/KG/MIN: 10 INJECTION, EMULSION INTRAVENOUS at 10:18

## 2019-11-04 RX ADMIN — MIDAZOLAM 2 MG: 1 INJECTION INTRAMUSCULAR; INTRAVENOUS at 09:45

## 2019-11-04 RX ADMIN — SODIUM CHLORIDE, POTASSIUM CHLORIDE, SODIUM LACTATE AND CALCIUM CHLORIDE: 600; 310; 30; 20 INJECTION, SOLUTION INTRAVENOUS at 17:58

## 2019-11-04 RX ADMIN — HYDROXYZINE HYDROCHLORIDE 10 MG: 10 TABLET ORAL at 16:00

## 2019-11-04 RX ADMIN — Medication 10 MG: at 10:25

## 2019-11-04 RX ADMIN — SODIUM CHLORIDE, POTASSIUM CHLORIDE, SODIUM LACTATE AND CALCIUM CHLORIDE: 600; 310; 30; 20 INJECTION, SOLUTION INTRAVENOUS at 10:00

## 2019-11-04 RX ADMIN — DEXAMETHASONE SODIUM PHOSPHATE 4 MG: 4 INJECTION, SOLUTION INTRA-ARTICULAR; INTRALESIONAL; INTRAMUSCULAR; INTRAVENOUS; SOFT TISSUE at 10:53

## 2019-11-04 ASSESSMENT — ACTIVITIES OF DAILY LIVING (ADL)
TOILETING: 0-->INDEPENDENT
FALL_HISTORY_WITHIN_LAST_SIX_MONTHS: NO
RETIRED_COMMUNICATION: 0-->UNDERSTANDS/COMMUNICATES WITHOUT DIFFICULTY
SWALLOWING: 0-->SWALLOWS FOODS/LIQUIDS WITHOUT DIFFICULTY
DRESS: 0-->INDEPENDENT
RETIRED_EATING: 0-->INDEPENDENT
COGNITION: 0 - NO COGNITION ISSUES REPORTED
AMBULATION: 1-->ASSISTIVE EQUIPMENT
BATHING: 0-->INDEPENDENT
TRANSFERRING: 0-->INDEPENDENT

## 2019-11-04 ASSESSMENT — MIFFLIN-ST. JEOR: SCORE: 1791.42

## 2019-11-04 NOTE — ANESTHESIA PROCEDURE NOTES
Peripheral nerve/Neuraxial procedure note : Femoral and Adductor canal  Pre-Procedure  Performed by Tyler Cartwright, DO  Referred by DAGMAR BROWNING MD  Location: pre-op    Procedure Times:11/4/2019 9:46 AM and 11/4/2019 9:57 AM  Pre-Anesthestic Checklist: patient identified, IV checked, site marked, risks and benefits discussed, informed consent, monitors and equipment checked, pre-op evaluation, at physician/surgeon's request and post-op pain management    Timeout  Correct Patient: Yes   Correct Procedure: Yes   Correct Site: Yes   Correct Laterality: Yes   Correct Position: Yes   Site Marked: Yes   .   Procedure Documentation    .    Procedure: Femoral and Adductor canal, right.   Patient Position:supine   Ultrasound used to identify targeted nerve, plexus, or vascular marker and placed a needle adjacent to it., Ultrasound was used to visualize the spread of the anesthetic in close proximity to the above stated nerve.   Patient Prep/Sterile Barriers; mask, sterile gloves, povidone-iodine 7.5% surgical scrub.  Nerve Stim: Initial Level 1 mA. Lowest motor response mA..  Needle: insulated    Needle Length (Inches) 3.13   Insertion Method: Single Shot.        Assessment/Narrative  Paresthesias: No.  .  The placement was negative for: blood aspirated, painful injection and site bleeding.  Bolus given via needle..   Secured via.   Complications: none. Comments:  .The surgeon has given a verbal order transferring care of this patient to me for the performance of a regional analgesia block for post-op pain control. It is requested of me because I am uniquely trained and qualified to perform this block and the surgeon is neither trained nor qualified to perform this procedure.    .Femoral Nerve Block    Medication- Bupivicaine 0.75% 16cc + Lido 2% w/ epi 1:200k 4cc  GAYLE Cartwright

## 2019-11-04 NOTE — PROGRESS NOTES
Arrived to room 622 from PACU at 1340 via bed, alert and oriented x 4, oriented to room and call system, dressing CDI, CMS intact, IV patent and infusing, hemovac and lin patent, rates pain 0/10, reviewed welcome folder and pain/medication information packet with patient and  and daughter. SCD's on BLE. Sukumar ice chips well. Frequent VS started.

## 2019-11-04 NOTE — ANESTHESIA CARE TRANSFER NOTE
Patient: Sg Francisco    Procedure(s):  Right total knee arthroplasty (spinal with adductor block)    Diagnosis: djd  Diagnosis Additional Information: No value filed.    Anesthesia Type:   No value filed.     Note:  Airway :Room Air  Patient transferred to:PACU  Handoff Report: Identifed the Patient, Identified the Reponsible Provider, Reviewed the pertinent medical history, Discussed the surgical course, Reviewed Intra-OP anesthesia mangement and issues during anesthesia, Set expectations for post-procedure period and Allowed opportunity for questions and acknowledgement of understanding      Vitals: (Last set prior to Anesthesia Care Transfer)    CRNA VITALS  11/4/2019 1139 - 11/4/2019 1217      11/4/2019             Resp Rate (observed):  14    EKG:  NSR                Electronically Signed By: ZHAO Romo CRNA  November 4, 2019  12:17 PM

## 2019-11-04 NOTE — ANESTHESIA PREPROCEDURE EVALUATION
Anesthesia Pre-Procedure Evaluation    Patient: Sg Francisco   MRN: [id number] : 1950          Preoperative Diagnosis: djd    Procedure(s):  Right total knee arthroplasty (spinal with adductor block)    Past Medical History:   Diagnosis Date     Anemia     boarderline anemia     Arthritis      Gastroesophageal reflux disease      Hypertension      Motion sickness      PONV (postoperative nausea and vomiting)      Past Surgical History:   Procedure Laterality Date     ARTHROPLASTY KNEE Left 2019    Procedure: Left total knee arthroplasty;  Surgeon: Clay Bourgeois MD;  Location: [facility] OR     BREAST SURGERY      lumpectomy     EYE SURGERY      cataract     GYN SURGERY      hysterectomy, BSO     ORTHOPEDIC SURGERY Left     hand     ORTHOPEDIC SURGERY Left     knee scope     SOFT TISSUE SURGERY      lipoma     Anesthesia Evaluation     . Pt has had prior anesthetic.     History of anesthetic complications   - PONV        ROS/MED HX    ENT/Pulmonary:  - neg pulmonary ROS     Neurologic:  - neg neurologic ROS     Cardiovascular:  - neg cardiovascular ROS   (+) hypertension----. : . . . :. .       METS/Exercise Tolerance:     Hematologic:  - neg hematologic  ROS       Musculoskeletal:  - neg musculoskeletal ROS       GI/Hepatic:  - neg GI/hepatic ROS   (+) GERD       Renal/Genitourinary:  - ROS Renal section negative       Endo: Comment: .Body mass index is 46.43 kg/m .   - neg endo ROS       Psychiatric:  - neg psychiatric ROS       Infectious Disease:  - neg infectious disease ROS       Malignancy:         Other: Comment: .Lab Test        19                       0734          1655          HGB          10.8*         --           PLT           --          351            Lab Test        19                       0820          0734          1655          POTASSIUM    3.6           --           --           CR           0.90          --          0.95         "  GLC           --          111*          --                                    Physical Exam  Normal systems: cardiovascular and pulmonary    Airway   Mallampati: II    Dental     Cardiovascular   Rhythm and rate: regular and normal      Pulmonary    breath sounds clear to auscultation            Lab Results   Component Value Date    HGB 10.8 (L) 08/13/2019     08/12/2019    POTASSIUM 3.6 11/04/2019    CR 0.90 11/04/2019     (H) 08/13/2019       Preop Vitals  BP Readings from Last 3 Encounters:   11/04/19 139/88   08/13/19 95/48    Pulse Readings from Last 3 Encounters:   11/04/19 81   08/13/19 79      Resp Readings from Last 3 Encounters:   11/04/19 13   08/13/19 16    SpO2 Readings from Last 3 Encounters:   11/04/19 99%   08/13/19 92%      Temp Readings from Last 1 Encounters:   11/04/19 96.1  F (35.6  C) (Temporal)    Ht Readings from Last 1 Encounters:   11/04/19 1.651 m (5' 5\")      Wt Readings from Last 1 Encounters:   11/04/19 126.6 kg (279 lb)    Estimated body mass index is 46.43 kg/m  as calculated from the following:    Height as of this encounter: 1.651 m (5' 5\").    Weight as of this encounter: 126.6 kg (279 lb).       Anesthesia Plan      History & Physical Review  History and physical reviewed and following examination; no interval change.    ASA Status:  3 .        Plan for Spinal and For Post-op pain in coordination with surgeon   PONV prophylaxis:  Ondansetron (or other 5HT-3) and Dexamethasone or Solumedrol       Postoperative Care  Postoperative pain management:  IV analgesics, Oral pain medications and Multi-modal analgesia.      Consents                 Tyler Cartwright DO                    .  "

## 2019-11-04 NOTE — PLAN OF CARE
PT: Orders received, evaluation completed and treatment initiated. 69 year old female s/p R TKA. PMH significant for L TKA in 8/2019. Lives in a trailer home with her . There are three stairs to enter (two grab bars). Mod I with a cane intermittently PTA.     Patient plan: Home with OP PT  Current status: Pt agreeable to trial mobility. Attempts supine>sit with SBA. With minimal movement of R LE reports 10/10 pain and declines all other mobility activity. Spoke with RN.   Anticipated status at discharge: Will update with mobility assessment.

## 2019-11-04 NOTE — ANESTHESIA PROCEDURE NOTES
Peripheral nerve/Neuraxial procedure note : intrathecal  Pre-Procedure  Performed by Tyler Cartwright DO  Location: pre-op    Procedure Times:11/4/2019 9:58 AM and 11/4/2019 10:06 AM  Pre-Anesthestic Checklist: patient identified, IV checked, site marked, risks and benefits discussed, informed consent, monitors and equipment checked, pre-op evaluation, at physician/surgeon's request and post-op pain management    Timeout  Correct Patient: Yes   Correct Procedure: Yes   Correct Site: Yes   Correct Laterality: Yes   Correct Position: Yes   Site Marked: Yes   .   Procedure Documentation    .    Procedure: intrathecal, .   Patient Position:sitting Insertion Site:L3-4  (midline approach)     Patient Prep/Sterile Barriers; mask, sterile gloves, povidone-iodine 7.5% surgical scrub.  .  Needle:  Spinal Needle (gauge): 24  Spinal/LP Needle Length (inches): 3.5 Introducer used .        Assessment/Narrative  .  .  clear CSF fluid removed . Comments:  Bupivicaine 15mg    R Cartwright

## 2019-11-04 NOTE — CONSULTS
[facility]  Hospitalist Consult Note  Name: Sg Francisco    MRN: [id number]  YOB: 1950    Age: 69 year old  Date of admission: 11/4/2019  Primary care provider: Lindsey Pichardo     Requesting Physician:  Dr. Bourgeois  Reason for consult:  Post-operative medical management         Assessment and Plan:   Sg Francisco is a 69 year old female with history including prior left TKA, hypertension, remote history of breast cancer who was hospitalized for right TKA.  She currently is doing well postoperatively.    1. DJD s/p right TKA on 11/4/19: The patient is doing well, currently has well controlled pain and is hemodynamically stable.  Previously had a lot of issues with nausea after a left TKA but did not do general anesthesia and not having any issues with that so far. Continue incentive spirometry and check hemoglobin to evaluate for surgical blood loss and potential need for transfusion.     2. Hypertension (chronic).  Chronically on triamterene-HCTZ.  Resume with hold parameters starting tomorrow.    3.  Remote hx of breast ca     4.  GERD    5.  Glaucoma: She brought her home drops.  Okay to resume once meds are verified.    Thank you for the consultation, we will continue to follow along during the hospitalization. Please page with any questions or concerns.         History of Present Illness:   Sg Francisco is a 69 year old female with history including prior left TKA, hypertension, remote history of breast cancer who was hospitalized for right TKA. Pre-operative note was fully reviewed and recommendations acknowledged.     Postoperatively she feels she is doing quite well.  After receiving general anesthesia in the past she has had a lot of issues with nausea and vomiting, this time she reports she did a spinal for anesthesia and has no nausea, vomiting and of course no sedation issues.  Denies any cardiopulmonary complaints.  She feels she can move her distal lower extremities just fine.   Probably denies any other complaints at this point.              Past Medical History:     Past Medical History:   Diagnosis Date     Anemia     boarderline anemia     Arthritis      Gastroesophageal reflux disease      Hypertension      Motion sickness      PONV (postoperative nausea and vomiting)              Past Surgical History:     Past Surgical History:   Procedure Laterality Date     ARTHROPLASTY KNEE Left 8/12/2019    Procedure: Left total knee arthroplasty;  Surgeon: Clay Bourgeois MD;  Location: [facility] OR     BREAST SURGERY      lumpectomy     EYE SURGERY      cataract     GYN SURGERY      hysterectomy, BSO     ORTHOPEDIC SURGERY Left     hand     ORTHOPEDIC SURGERY Left     knee scope     SOFT TISSUE SURGERY      lipoma               Social History:     Social History     Tobacco Use     Smoking status: Never Smoker     Smokeless tobacco: Never Used   Substance Use Topics     Alcohol use: Yes     Comment: once a year             Family History:   Family history was fully reviewed and non-contributory in this case.          Allergies:     Allergies   Allergen Reactions     Hydromorphone Nausea and Vomiting     dizziness     Adhesive Tape Rash     Cortisone Itching and Rash             Medications:     Prior to Admission medications    Medication Sig Last Dose Taking? Auth Provider   acetaminophen (TYLENOL) 500 MG tablet Take 1,500 mg by mouth At Bedtime 11/3/2019 at Unknown time Yes Reported, Patient   carboxymethylcellulose PF (REFRESH PLUS) 0.5 % ophthalmic solution Place 1 drop into both eyes 4 times daily as needed for dry eyes 11/4/2019 at Unknown time Yes Reported, Patient   travoprost BAK FREE (TRAVATAN Z) 0.004 % ophthalmic solution Place 1 drop into both eyes At Bedtime 11/3/2019 at Unknown time Yes Reported, Patient   triamterene-HCTZ (MAXZIDE-25) 37.5-25 MG tablet Take 1 tablet by mouth daily 11/3/2019 at Unknown time Yes Reported, Patient   TURMERIC PO Take by mouth daily Past Month at  "Unknown time Yes Reported, Patient       Current hospital administered medication list (MAR) also reviewed.          Review of Systems:   A comprehensive greater than 10 system review of systems was carried out.  Pertinent positives and negatives are noted above.  Otherwise negative for contributory info.            Physical Exam:   Blood pressure 136/83, pulse 71, temperature 96  F (35.6  C), temperature source Oral, resp. rate 25, height 1.651 m (5' 5\"), weight 126.6 kg (279 lb), SpO2 96 %.  Exam:  GENERAL: No apparent distress. Awake, alert, and fully oriented.  HEENT: Normocephalic, atraumatic. Extraocular movements intact.  CARDIOVASCULAR: Regular rate and rhythm without murmurs or rubs. No S3.  PULMONARY: Clear bilaterally.  ABDOMINAL: Soft, non-tender, non-distended. Bowel sounds normoactive. No hepatosplenomegaly.  EXTREMITIES: No cyanosis or clubbing. No edema.  NEUROLOGICAL: CN 2-12 grossly intact, no focal neurological deficits.  DERMATOLOGICAL: No rash, ulcer, ecchymoses, jaundice.         Data:   Imaging:  Reviewed.    EKG/Telemetry:  Reviewed.    Labs: Reviewed.   Pre-op hgb 12.6    Edmundo Ambrocio MD  [facility] Hospitalist  November 4, 2019    "

## 2019-11-04 NOTE — CONSULTS
Care Transitions Team: Following for CC, discharge planning, and disposition.       Per chart review, [facility] care plan and PT assess/rec's pt is noted to be aligned with [facility] care plan for discharge,  to home with support of  Franck.  Pt is anticipating  Outpt PT  @ [facility] [place] 11/07/19    Ortho follow up appointment has been scheduled for 11/14/19 at 09:20     Will follow in collaboration with [facility] CM  Vicenta HEWITT [phone] [facility] for discharge planning.     Kathe Dill RN, BSN, PHN, CTS  Care Coordinator  [facility]  [phone]

## 2019-11-04 NOTE — BRIEF OP NOTE
TKR for Pre/Post OA knee  Bk  TT est 60 w EBL min (see dictation for full)  Spec none  No anticipated complications

## 2019-11-04 NOTE — OP NOTE
Procedure Date: 11/04/2019      PREOPERATIVE DIAGNOSES:   1.  Osteoarthritis, right knee.   2.  Morbid obesity (BMI equals 45).      POSTOPERATIVE DIAGNOSES:   1.  Osteoarthritis, right knee.   2.  Morbid obesity (BMI equals 45).      PROCEDURES:  Right total knee arthroplasty.      SURGEON:  Clay Bourgeois MD      ASSISTANT:  Elodia Werner PA-C      ANESTHESIA:  Spinal with adductor canal block.      ESTIMATED BLOOD LOSS:  25 mL.      TOURNIQUET TIME:  Approximately 75 minutes.      COMPLICATIONS:  None.      DESCRIPTION OF PROCEDURE:  The patient was taken to the operating room where after administration of spinal anesthetic, adductor canal block, tranexamic acid, antibiotic prophylaxis and sterile prep and drape, the leg was exsanguinated and an anterior incision was made followed by a medial arthrotomy with a moderate posteromedial and direct joint line release.  Of note, the patient had morbid obesity and substantial leg and trunk size.  There was substantial subcutaneous adipose tissue, which made all aspects of the procedure, especially exposure, more difficult and time consuming.  An intramedullary 5 degree guide was used with anterior referencing at 3 degrees of external rotation, which matched the epicondylar axis, and a box cut was made for PCL substitution.  Retractors were carefully placed about the proximal tibia and a total of 3 mm was resected from the medial side with a cut perpendicular to the mechanical axis of the tibia.  Proximal tibia preparation was performed at the junction of the medial and middle thirds of the tubercle.  Under direct vision and protecting the neurovascular structures, posterior osteophytes were removed and a capsular injection performed.  7 mm was then resected from the undersurface of a 21 mm patella and sized appropriately.  Flexion and extension gaps were rectangular and symmetric.      After copious lavage and drying, antibiotic Simplex was used due to the  patient's obesity, and a Rocio NexGen gender specific cruciate substituting size F femur, size 3 tibia, 10-mm polyethylene insert and 32 mm patellar button were used.  Range of motion, balance and tracking all appeared excellent with minimal laxity at 0, 30 and 90 degrees.  Copious antibiotic and Betadine irrigation was performed, followed by layered closure over a deep drain.  There were no complications.         DAGMAR BROWNING MD             D: 2019   T: 2019   MT: GRANT      Name:     MAIKEL ZULUAGA   MRN:      [id number]        Account:        [id number]   :      1950           Procedure Date: 2019      Document: [id number]     Emergent/ED

## 2019-11-04 NOTE — ANESTHESIA POSTPROCEDURE EVALUATION
Patient: Sg Francisco    Procedure(s):  Right total knee arthroplasty (spinal with adductor block)    Diagnosis:djd  Diagnosis Additional Information: No value filed.    Anesthesia Type:  Spinal, For Post-op pain in coordination with surgeon    Note:  Anesthesia Post Evaluation    Patient location during evaluation: PACU  Patient participation: Able to fully participate in evaluation  Level of consciousness: awake  Pain management: adequate  Airway patency: patent  Cardiovascular status: acceptable  Respiratory status: acceptable  Hydration status: acceptable  PONV: controlled     Anesthetic complications: None          Last vitals:  Vitals:    11/04/19 1315 11/04/19 1320 11/04/19 1325   BP:      Pulse:      Resp: 15 24 26   Temp:   96.5  F (35.8  C)   SpO2: 99% 99% 100%         Electronically Signed By: Tyler Cartwright DO  November 4, 2019  1:32 PM

## 2019-11-04 NOTE — PLAN OF CARE
[facility]      OUTPATIENT PHYSICAL THERAPY EVALUATION  PLAN OF TREATMENT FOR OUTPATIENT REHABILITATION  (COMPLETE FOR INITIAL CLAIMS ONLY)  Patient's Last Name, First Name, M.I.  YOB: 1950  Sg Francisco                           Provider's Name  [facility] Medical Record No.  [id number]                               Onset Date:  11/04/19   Start of Care Date:      11/4/19   Type:     _X_PT   ___OT   ___SLP Medical Diagnosis:      S/p R TKA                     PT Diagnosis:  Impaired gait   Visits from SOC:  1   _________________________________________________________________________________  Plan of Treatment/Functional Goals    Planned Interventions: Cryotherapy, PRN  bed mobility training, gait training, ROM, strengthening, transfer training, progressive activity/exercise, home program guidelines,       Goals: See Physical Therapy Goals on Care Plan in [facility] electronic health record.    Therapy Frequency: 2x/day  Predicted Duration of Therapy Intervention: 2 days  _________________________________________________________________________________    I CERTIFY THE NEED FOR THESE SERVICES FURNISHED UNDER        THIS PLAN OF TREATMENT AND WHILE UNDER MY CARE     (Physician co-signature of this document indicates review and certification of the therapy plan).               ,      Referring Physician: Bk BUSH            Initial Assessment        See Physical Therapy evaluation dated   in Epic electronic health record.

## 2019-11-05 ENCOUNTER — APPOINTMENT (OUTPATIENT)
Dept: PHYSICAL THERAPY | Facility: CLINIC | Age: 69
End: 2019-11-05
Attending: ORTHOPAEDIC SURGERY
Payer: MEDICARE

## 2019-11-05 VITALS
TEMPERATURE: 96.8 F | HEIGHT: 65 IN | WEIGHT: 279 LBS | OXYGEN SATURATION: 95 % | DIASTOLIC BLOOD PRESSURE: 74 MMHG | RESPIRATION RATE: 16 BRPM | HEART RATE: 86 BPM | SYSTOLIC BLOOD PRESSURE: 136 MMHG | BODY MASS INDEX: 46.48 KG/M2

## 2019-11-05 LAB
GLUCOSE SERPL-MCNC: 131 MG/DL (ref 70–99)
HGB BLD-MCNC: 9.9 G/DL (ref 11.7–15.7)

## 2019-11-05 PROCEDURE — 97110 THERAPEUTIC EXERCISES: CPT | Mod: GP

## 2019-11-05 PROCEDURE — 82947 ASSAY GLUCOSE BLOOD QUANT: CPT | Performed by: ORTHOPAEDIC SURGERY

## 2019-11-05 PROCEDURE — 85018 HEMOGLOBIN: CPT | Performed by: ORTHOPAEDIC SURGERY

## 2019-11-05 PROCEDURE — 97116 GAIT TRAINING THERAPY: CPT | Mod: GP

## 2019-11-05 PROCEDURE — 25800030 ZZH RX IP 258 OP 636: Performed by: ORTHOPAEDIC SURGERY

## 2019-11-05 PROCEDURE — 25000132 ZZH RX MED GY IP 250 OP 250 PS 637: Mod: GY | Performed by: INTERNAL MEDICINE

## 2019-11-05 PROCEDURE — 99207 ZZC CDG-CODE CATEGORY CHANGED: CPT | Performed by: INTERNAL MEDICINE

## 2019-11-05 PROCEDURE — 25000132 ZZH RX MED GY IP 250 OP 250 PS 637: Mod: GY | Performed by: ORTHOPAEDIC SURGERY

## 2019-11-05 PROCEDURE — 99224 ZZC SUBSEQUENT OBSERVATION CARE,LEVEL I: CPT | Performed by: INTERNAL MEDICINE

## 2019-11-05 PROCEDURE — 25000128 H RX IP 250 OP 636: Performed by: ORTHOPAEDIC SURGERY

## 2019-11-05 PROCEDURE — 36415 COLL VENOUS BLD VENIPUNCTURE: CPT | Performed by: ORTHOPAEDIC SURGERY

## 2019-11-05 RX ORDER — ONDANSETRON 4 MG/1
4-8 TABLET, ORALLY DISINTEGRATING ORAL EVERY 8 HOURS PRN
Qty: 10 TABLET | Refills: 0 | Status: SHIPPED | OUTPATIENT
Start: 2019-11-05

## 2019-11-05 RX ORDER — ACETAMINOPHEN 325 MG/1
975 TABLET ORAL EVERY 8 HOURS
Qty: 270 TABLET | Refills: 0 | Status: SHIPPED | OUTPATIENT
Start: 2019-11-05 | End: 2019-12-05

## 2019-11-05 RX ORDER — OXYCODONE HYDROCHLORIDE 5 MG/1
5 TABLET ORAL
Qty: 50 TABLET | Refills: 0 | Status: SHIPPED | OUTPATIENT
Start: 2019-11-05

## 2019-11-05 RX ORDER — AMOXICILLIN 250 MG
1-2 CAPSULE ORAL 2 TIMES DAILY
Qty: 56 TABLET | Refills: 0 | Status: SHIPPED | OUTPATIENT
Start: 2019-11-05 | End: 2019-11-19

## 2019-11-05 RX ORDER — HYDROXYZINE HYDROCHLORIDE 10 MG/1
10 TABLET, FILM COATED ORAL EVERY 6 HOURS PRN
Qty: 30 TABLET | Refills: 11 | Status: SHIPPED | OUTPATIENT
Start: 2019-11-05 | End: 2019-12-05

## 2019-11-05 RX ORDER — IBUPROFEN 600 MG/1
600 TABLET, FILM COATED ORAL EVERY 8 HOURS
Qty: 90 TABLET | Refills: 0 | Status: SHIPPED | OUTPATIENT
Start: 2019-11-05 | End: 2019-12-05

## 2019-11-05 RX ADMIN — TRAVOPROST OPHTHALMIC SOLUTION 1 DROP: 0.04 SOLUTION OPHTHALMIC at 00:01

## 2019-11-05 RX ADMIN — HYDROXYZINE HYDROCHLORIDE 10 MG: 10 TABLET ORAL at 03:56

## 2019-11-05 RX ADMIN — OXYCODONE HYDROCHLORIDE 10 MG: 5 TABLET ORAL at 12:43

## 2019-11-05 RX ADMIN — TRIAMTERENE AND HYDROCHLOROTHIAZIDE 1 TABLET: 37.5; 25 TABLET ORAL at 08:26

## 2019-11-05 RX ADMIN — KETOROLAC TROMETHAMINE 15 MG: 15 INJECTION, SOLUTION INTRAMUSCULAR; INTRAVENOUS at 02:02

## 2019-11-05 RX ADMIN — OXYCODONE HYDROCHLORIDE 10 MG: 5 TABLET ORAL at 03:55

## 2019-11-05 RX ADMIN — HYDROXYZINE HYDROCHLORIDE 10 MG: 10 TABLET ORAL at 12:43

## 2019-11-05 RX ADMIN — KETOROLAC TROMETHAMINE 15 MG: 15 INJECTION, SOLUTION INTRAMUSCULAR; INTRAVENOUS at 08:35

## 2019-11-05 RX ADMIN — ENOXAPARIN SODIUM 40 MG: 40 INJECTION SUBCUTANEOUS at 12:43

## 2019-11-05 RX ADMIN — SODIUM CHLORIDE, POTASSIUM CHLORIDE, SODIUM LACTATE AND CALCIUM CHLORIDE: 600; 310; 30; 20 INJECTION, SOLUTION INTRAVENOUS at 00:05

## 2019-11-05 RX ADMIN — ACETAMINOPHEN 975 MG: 325 TABLET, FILM COATED ORAL at 14:05

## 2019-11-05 RX ADMIN — ACETAMINOPHEN 975 MG: 325 TABLET, FILM COATED ORAL at 06:17

## 2019-11-05 RX ADMIN — CEFAZOLIN SODIUM 2 G: 2 INJECTION, SOLUTION INTRAVENOUS at 02:02

## 2019-11-05 RX ADMIN — OXYCODONE HYDROCHLORIDE 10 MG: 5 TABLET ORAL at 08:26

## 2019-11-05 NOTE — PROGRESS NOTES
Patient has returned to baseline mental status: yes  Patient vital signs are at baseline: yes  Patient able to ambulate as they were prior to admission or with assist devices provided by therapies during their stay: yes  Patient voiding: yes  Patient able to tolerate oral intake: yes  Patient has adequate pain control using Oral analgesics: yes

## 2019-11-05 NOTE — PLAN OF CARE
Patient has returned to baseline mental status: yes  Patient vital signs are at baseline: yes  Patient able to ambulate as they were prior to admission or with assist devices provided by therapies during their stay: yes  Patient voiding: no, DTV  Patient able to tolerate oral intake: yes  Patient has adequate pain control using Oral analgesics: yes

## 2019-11-05 NOTE — PLAN OF CARE
Patient plan: Home with OP PT  Current status: Supine>sit with IND. Sit to stand with SBA. Ambulates 100' with FWW and supervision, an additional 100' with 4WW and supervision. Performs 4 stairs with supervision and bilateral rail. Issued HEP; educated on frequency.   Anticipated status at discharge: Supervision with 4WW/FWW and stairs.     Physical Therapy Discharge Summary    Reason for therapy discharge:    All goals and outcomes met, no further needs identified.    Progress towards therapy goal(s). See goals on Care Plan in [facility] electronic health record for goal details.  Goals met    Therapy recommendation(s):    Continue home exercise program.  Defer recommendations to ortho surgeon/PA

## 2019-11-05 NOTE — PLAN OF CARE
A and Ox4. PIV infusing LR. Sukumar regular diet, passing gas. Pt. Has many needs, can be difficult to meet, pt. Gets agitated easily. Pain management explained to pt. Pt. Voices irritation with pain being different with this surgery vs last one, pt. Explained that pain can vary. Bed alarm on,  turns bed alarm off. Pt. Sat at bedside 2X during shift. Will continue to monitor.

## 2019-11-05 NOTE — PROGRESS NOTES
"Orthopedic Surgery  11/5/2019  POD 1    S: Patient voices no unexpected ortho complaints today. Denies chest pain or shortness of breath. Wants to discharge today. Wants wrap off.    O: Blood pressure (!) 140/64, pulse 71, temperature 96.6  F (35.9  C), temperature source Oral, resp. rate 17, height 1.651 m (5' 5\"), weight 126.6 kg (279 lb), SpO2 94 %.  Lab Results   Component Value Date    HGB 10.8 08/13/2019     No results found for: INR  I/O last 3 completed shifts:  In: 2690 [P.O.:740; I.V.:1950]  Out: 1635 [Urine:1350; Drains:260; Blood:25]  Distal extremity CMSI bilaterally.  Calves are negative bilaterally, both soft and nontender.  The dressing is C/D/I.      A: Ms. Francisco is doing well status post Procedure(s):  Right total knee arthroplasty (spinal with adductor block).    P: May remove ace bandage. Continue physical therapy. Continue DVT pphx with lovenox for 7 days due to high mobility and low risk factors for DVT. Anticipate discharge to discharge home today.    Elodia Werner PA-C  [phone]  "

## 2019-11-05 NOTE — PLAN OF CARE
PRIMARY DIAGNOSIS: Right Knee Arthoplasty     OUTPATIENT/OBSERVATION GOALS TO BE MET BEFORE DISCHARGE  1. VSS: stable, A&Ox3. Pain managed with scheduled Toradol,  Tylenol, PRN Atarax and Oxycodone.    2. Tolerating PO medications: YES     3. Return to near baseline physical activity: YES.  Up with Ax2,gbelt and walker.     4. Cleared for discharge by consultants (if involved): No, but would like to go home today. Nafisa&Hemurban dc'd 3330. Voiding small amount.      Discharge Planner Nurse   Safe discharge environment identified: YES   Barriers to discharge: YES        Entered by: Jennifer Hernández 11/05/2019 3:24 AM       Please review provider order for any additional goals.   Nurse to notify provider when observation goals have been met and patient is ready for discharge.

## 2019-11-05 NOTE — PROGRESS NOTES
"[facility]  Hospitalist Consult progress Note  Name: Sg Francisco    MRN: [id number]  YOB: 1950    Age: 69 year old  Date of admission: 11/4/2019  Primary care provider: Lindsey Pichardo     Requesting Physician:  Dr. Bourgeois  Reason for consult:  Post-operative medical management         Assessment and Plan:   Sg Francisco is a 69 year old female with history including prior left TKA, hypertension, remote history of breast cancer who was hospitalized for right TKA on 11/4.  She currently is doing well postoperatively.  She plans to discharge home today.    1. DJD s/p right TKA on 11/4/19: The patient is doing well, currently has well controlled pain and is hemodynamically stable.  Previously had a lot of issues with nausea after a left TKA but did not do general anesthesia and not having any issues with that so far. Continue incentive spirometry and check hemoglobin to evaluate for surgical blood loss and potential need for transfusion.     2. Hypertension (chronic).  Chronically on triamterene-HCTZ.  Resume with hold parameters starting today.    3.  Remote hx of breast ca     4.  GERD    5.  Glaucoma: She brought her home drops.  Okay to resume once meds are verified.             Interval History:   No issues overnight, continues to feel well today  She anticipates discharging home  No cardiopulmonary complaints    Okay disposition as long as recheck hemoglobin is within expected range.                      Physical Exam:   Blood pressure 125/68, pulse 86, temperature 96.8  F (36  C), temperature source Oral, resp. rate 16, height 1.651 m (5' 5\"), weight 126.6 kg (279 lb), SpO2 95 %.  Exam:  GENERAL: No apparent distress. Awake, alert, and fully oriented.  HEENT: Normocephalic, atraumatic. Extraocular movements intact.  CARDIOVASCULAR: Regular rate and rhythm without murmurs or rubs. No S3.  PULMONARY: Clear bilaterally.  ABDOMINAL: Soft, non-tender, non-distended. Bowel sounds normoactive. " No hepatosplenomegaly.  EXTREMITIES: No cyanosis or clubbing. No edema.  NEUROLOGICAL: CN 2-12 grossly intact, no focal neurological deficits.  DERMATOLOGICAL: No rash, ulcer, ecchymoses, jaundice.         Data:   Imaging:  Reviewed.    EKG/Telemetry:  Reviewed.    Labs: Reviewed.   Pre-op hgb 12.6, recheck pending    Edmundo Ambrocio MD  [facility] Hospitalist  November 4, 2019

## 2019-11-05 NOTE — DISCHARGE INSTRUCTIONS
Return to clinic in 10-14 days.  Call [phone] to schedule or if you experience any problems before your scheduled appointment.    See general discharge instruction sheet for knees  1. Do exercises at home as instructed by therapist twice a day. Continue outpatient therapy as ordered by your doctor.  2. Ice knee after exercises and therapy.  3. Examine dressing daily for problems.  4. May shower, can get dressing wet, no soaking (no bathtubs, pools or hot tubs)  5. Notify your dentist or physician of your implant so you can get antibiotics before any dental work or before any invasive procedure (ie: colonoscopy)    Aquacel dressing:  DO NOT CHANGE DRESSING DAILY.  Leave this dressing on until follow-up appointment with Orthopedic Surgeon.  Dressing is waterproof, can shower with it on, pat dry when done.  No soaking such as in tub baths, pools or hot tubs    While on narcotic pain medication, to prevent constipation:  1. Drink plenty of water to keep well hydrated   2. May take over the counter Colace or Senna (follow instructions on label)      Call your physician if: ([phone])   1. Persistent fever greater than 100 degrees with body chills or excessive sweating.  2. Increased/persistent redness, localized warmth, tenderness, drainage or swelling at incision site. Greater than 50% drainage on dressing.   3. Persistent pain not controlled with oral pain medications, ice and rest.   4. No bowel movement in 3 days (may use Milk of Magnesia or other over the counter remedy).  5. Chest pain, shortness of breath, and/or calf pain with excessive swelling.  6. Generalized feeling of illness.  7. Any other questions or concerns related to your surgery/recovery.    Thank you for allowing [facility] to participate in your cares!!

## 2024-10-14 ENCOUNTER — OFFICE VISIT (OUTPATIENT)
Facility: LOCATION | Age: 74
End: 2024-10-14
Payer: MEDICARE

## 2024-10-14 DIAGNOSIS — H40.1132 PRIMARY OPEN-ANGLE GLAUCOMA, BILATERAL, MODERATE STAGE: ICD-10-CM

## 2024-10-14 DIAGNOSIS — H35.372 PUCKERING OF MACULA LEFT EYE: Primary | ICD-10-CM

## 2024-10-14 PROCEDURE — 92014 COMPRE OPH EXAM EST PT 1/>: CPT | Performed by: OPHTHALMOLOGY

## 2024-10-14 PROCEDURE — 92020 GONIOSCOPY: CPT | Performed by: OPHTHALMOLOGY

## 2024-10-14 PROCEDURE — 92083 EXTENDED VISUAL FIELD XM: CPT | Performed by: OPHTHALMOLOGY

## 2024-10-14 PROCEDURE — 92133 CPTRZD OPH DX IMG PST SGM ON: CPT | Performed by: OPHTHALMOLOGY

## 2024-10-14 ASSESSMENT — INTRAOCULAR PRESSURE
OD: 17
OS: 18

## 2025-05-12 ENCOUNTER — OFFICE VISIT (OUTPATIENT)
Facility: LOCATION | Age: 75
End: 2025-05-12
Payer: MEDICARE

## 2025-05-12 DIAGNOSIS — H40.1132 PRIMARY OPEN-ANGLE GLAUCOMA, BILATERAL, MODERATE STAGE: ICD-10-CM

## 2025-05-12 DIAGNOSIS — H35.372 PUCKERING OF MACULA LEFT EYE: Primary | ICD-10-CM

## 2025-05-12 PROCEDURE — 99214 OFFICE O/P EST MOD 30 MIN: CPT | Performed by: OPHTHALMOLOGY

## 2025-05-12 RX ORDER — BRIMONIDINE TARTRATE 2 MG/ML
0.2 % SOLUTION/ DROPS OPHTHALMIC
Qty: 10 | Refills: 4 | Status: INACTIVE
Start: 2025-05-12 | End: 2025-08-09

## 2025-05-12 RX ORDER — LATANOPROST 50 UG/ML
0.005 % SOLUTION OPHTHALMIC
Qty: 7.5 | Refills: 4 | Status: INACTIVE
Start: 2025-05-12 | End: 2025-08-09

## 2025-05-12 ASSESSMENT — INTRAOCULAR PRESSURE
OD: 19
OS: 20

## (undated) DEVICE — DRSG AQUACEL AG 3.5X9.75" HYDROFIBER 412011

## (undated) DEVICE — PACK TOTAL KNEE BOXED LATEX FREE PO15TKFCT

## (undated) DEVICE — GLOVE PROTEXIS POWDER FREE 7.0 ORTHOPEDIC 2D73ET70

## (undated) DEVICE — LINEN ORTHO ACL PACK 5447

## (undated) DEVICE — GLOVE PROTEXIS POWDER FREE 8.5 ORTHOPEDIC 2D73ET85

## (undated) DEVICE — DRAIN HEMOVAC RESERVOIR KIT 10FR 1/8" MED 00-2550-002-10

## (undated) DEVICE — GLOVE PROTEXIS W/NEU-THERA 7.0  2D73TE70

## (undated) DEVICE — GLOVE PROTEXIS BLUE W/NEU-THERA 7.0  2D73EB70

## (undated) DEVICE — SUCTION MANIFOLD NEPTUNE 2 SYS 4 PORT 0702-020-000

## (undated) DEVICE — CATH TRAY FOLEY SURESTEP 16FR DRAIN BAG STATOCK A899916

## (undated) DEVICE — SOL NACL 0.9% IRRIG 1000ML BOTTLE 2F7124

## (undated) DEVICE — BLADE SAW SAGITTAL STRK 25X90X1.27MM HD SYS 6 6125-127-090

## (undated) DEVICE — NDL BLUNT 18GA 1" W/O FILTER 305181

## (undated) DEVICE — SYR 03ML LL W/O NDL 309657

## (undated) DEVICE — SET HANDPIECE INTERPULSE W/COAXIAL FAN SPRAY TIP 0210118000

## (undated) DEVICE — GLOVE PROTEXIS BLUE W/NEU-THERA 8.5  2D73EB85

## (undated) DEVICE — SU ETHIBOND 0 CT-1 CR 8X18" CX21D

## (undated) DEVICE — SU VICRYL 1 CT-1 27" J341H

## (undated) DEVICE — LINEN FULL SHEET 5511

## (undated) DEVICE — BAG CLEAR TRASH 1.3M 39X33" P4040C

## (undated) DEVICE — SU VICRYL 2-0 CT-1 27" UND J259H

## (undated) DEVICE — SOL NACL 0.9% IRRIG 3000ML BAG 2B7477

## (undated) DEVICE — DECANTER VIAL 2006S

## (undated) DEVICE — GLOVE PROTEXIS W/NEU-THERA 8.5  2D73TE85

## (undated) DEVICE — PREP CHLORAPREP 26ML TINTED ORANGE  260815

## (undated) DEVICE — CAST PADDING 6" STERILE 9046S

## (undated) DEVICE — GOWN LG DISP 9515

## (undated) DEVICE — BONE CEMENT MIXEVAC III HI VAC KIT  0206-015-000

## (undated) DEVICE — SOL WATER IRRIG 1000ML BOTTLE 2F7114

## (undated) DEVICE — TOURNIQUET CUFF 34" REPRO BROWN 60-7070-106

## (undated) DEVICE — BLADE SAW SAGITTAL STRK 13X90X1.27MM HD SYS 6 6113-127-090

## (undated) DEVICE — GOWN IMPERVIOUS SPECIALTY XLG/XLONG 32474

## (undated) RX ORDER — KETOROLAC TROMETHAMINE 30 MG/ML
INJECTION, SOLUTION INTRAMUSCULAR; INTRAVENOUS
Status: DISPENSED
Start: 2019-11-04

## (undated) RX ORDER — ONDANSETRON 2 MG/ML
INJECTION INTRAMUSCULAR; INTRAVENOUS
Status: DISPENSED
Start: 2019-08-12

## (undated) RX ORDER — FENTANYL CITRATE 50 UG/ML
INJECTION, SOLUTION INTRAMUSCULAR; INTRAVENOUS
Status: DISPENSED
Start: 2019-11-04

## (undated) RX ORDER — GLYCOPYRROLATE 0.2 MG/ML
INJECTION INTRAMUSCULAR; INTRAVENOUS
Status: DISPENSED
Start: 2019-08-12

## (undated) RX ORDER — LIDOCAINE HYDROCHLORIDE 10 MG/ML
INJECTION, SOLUTION EPIDURAL; INFILTRATION; INTRACAUDAL; PERINEURAL
Status: DISPENSED
Start: 2019-08-12

## (undated) RX ORDER — METOPROLOL TARTRATE 1 MG/ML
INJECTION, SOLUTION INTRAVENOUS
Status: DISPENSED
Start: 2019-08-12

## (undated) RX ORDER — GLYCOPYRROLATE 0.2 MG/ML
INJECTION INTRAMUSCULAR; INTRAVENOUS
Status: DISPENSED
Start: 2019-11-04

## (undated) RX ORDER — SCOLOPAMINE TRANSDERMAL SYSTEM 1 MG/1
PATCH, EXTENDED RELEASE TRANSDERMAL
Status: DISPENSED
Start: 2019-11-04

## (undated) RX ORDER — FENTANYL CITRATE 50 UG/ML
INJECTION, SOLUTION INTRAMUSCULAR; INTRAVENOUS
Status: DISPENSED
Start: 2019-08-12

## (undated) RX ORDER — CELECOXIB 200 MG/1
CAPSULE ORAL
Status: DISPENSED
Start: 2019-08-12

## (undated) RX ORDER — PROPOFOL 10 MG/ML
INJECTION, EMULSION INTRAVENOUS
Status: DISPENSED
Start: 2019-11-04

## (undated) RX ORDER — DEXAMETHASONE SODIUM PHOSPHATE 4 MG/ML
INJECTION, SOLUTION INTRA-ARTICULAR; INTRALESIONAL; INTRAMUSCULAR; INTRAVENOUS; SOFT TISSUE
Status: DISPENSED
Start: 2019-08-12

## (undated) RX ORDER — ACETAMINOPHEN 500 MG
TABLET ORAL
Status: DISPENSED
Start: 2019-08-12

## (undated) RX ORDER — LABETALOL 20 MG/4 ML (5 MG/ML) INTRAVENOUS SYRINGE
Status: DISPENSED
Start: 2019-08-12

## (undated) RX ORDER — KETOROLAC TROMETHAMINE 30 MG/ML
INJECTION, SOLUTION INTRAMUSCULAR; INTRAVENOUS
Status: DISPENSED
Start: 2019-08-12

## (undated) RX ORDER — NEOSTIGMINE METHYLSULFATE 1 MG/ML
VIAL (ML) INJECTION
Status: DISPENSED
Start: 2019-08-12

## (undated) RX ORDER — CEFAZOLIN SODIUM IN 0.9 % NACL 3 G/100 ML
INTRAVENOUS SOLUTION, PIGGYBACK (ML) INTRAVENOUS
Status: DISPENSED
Start: 2019-11-04

## (undated) RX ORDER — BUPIVACAINE HYDROCHLORIDE AND EPINEPHRINE 2.5; 5 MG/ML; UG/ML
INJECTION, SOLUTION EPIDURAL; INFILTRATION; INTRACAUDAL; PERINEURAL
Status: DISPENSED
Start: 2019-08-12

## (undated) RX ORDER — MORPHINE SULFATE 4 MG/ML
INJECTION, SOLUTION INTRAMUSCULAR; INTRAVENOUS
Status: DISPENSED
Start: 2019-08-12

## (undated) RX ORDER — MORPHINE SULFATE 10 MG/ML
INJECTION, SOLUTION INTRAMUSCULAR; INTRAVENOUS
Status: DISPENSED
Start: 2019-08-12

## (undated) RX ORDER — ONDANSETRON 2 MG/ML
INJECTION INTRAMUSCULAR; INTRAVENOUS
Status: DISPENSED
Start: 2019-11-04

## (undated) RX ORDER — PROPOFOL 10 MG/ML
INJECTION, EMULSION INTRAVENOUS
Status: DISPENSED
Start: 2019-08-12

## (undated) RX ORDER — DEXAMETHASONE SODIUM PHOSPHATE 4 MG/ML
INJECTION, SOLUTION INTRA-ARTICULAR; INTRALESIONAL; INTRAMUSCULAR; INTRAVENOUS; SOFT TISSUE
Status: DISPENSED
Start: 2019-11-04

## (undated) RX ORDER — BUPIVACAINE HYDROCHLORIDE AND EPINEPHRINE 2.5; 5 MG/ML; UG/ML
INJECTION, SOLUTION EPIDURAL; INFILTRATION; INTRACAUDAL; PERINEURAL
Status: DISPENSED
Start: 2019-11-04

## (undated) RX ORDER — KETAMINE HCL IN 0.9 % NACL 50 MG/5 ML
SYRINGE (ML) INTRAVENOUS
Status: DISPENSED
Start: 2019-11-04

## (undated) RX ORDER — CEFAZOLIN SODIUM IN 0.9 % NACL 3 G/100 ML
INTRAVENOUS SOLUTION, PIGGYBACK (ML) INTRAVENOUS
Status: DISPENSED
Start: 2019-08-12